# Patient Record
Sex: FEMALE | Race: WHITE | NOT HISPANIC OR LATINO | Employment: FULL TIME | URBAN - METROPOLITAN AREA
[De-identification: names, ages, dates, MRNs, and addresses within clinical notes are randomized per-mention and may not be internally consistent; named-entity substitution may affect disease eponyms.]

---

## 2018-06-23 ENCOUNTER — HISTORICAL (OUTPATIENT)
Dept: ADMINISTRATIVE | Facility: HOSPITAL | Age: 23
End: 2018-06-23

## 2022-03-21 LAB
PAP RECOMMENDATION EXT: NORMAL
PAP SMEAR: NORMAL

## 2022-03-24 LAB
PAP RECOMMENDATION EXT: NORMAL
PAP SMEAR: NORMAL

## 2022-04-06 ENCOUNTER — HISTORICAL (OUTPATIENT)
Dept: ADMINISTRATIVE | Facility: HOSPITAL | Age: 27
End: 2022-04-06

## 2022-04-06 LAB
T3FREE SERPL-MCNC: 2.33 PG/ML (ref 1.58–3.91)
T4 FREE SERPL-MCNC: 0.96 NG/DL (ref 0.7–1.48)
TSH SERPL-ACNC: 0.39 M[IU]/L (ref 0.35–4.94)

## 2022-07-25 ENCOUNTER — TELEPHONE (OUTPATIENT)
Dept: INTERNAL MEDICINE | Facility: CLINIC | Age: 27
End: 2022-07-25
Payer: COMMERCIAL

## 2022-07-25 NOTE — TELEPHONE ENCOUNTER
So the thyroid function was just checked in April I do not think this needs to be checked again is there any particular reason why she thinks it needs to be rechecked?

## 2022-07-25 NOTE — TELEPHONE ENCOUNTER
----- Message from Matilda Granados sent at 7/25/2022  2:30 PM CDT -----  Regarding: labs  Type:  Needs Medical Advice    Who Called: pt  Would the patient rather a call back or a response via [facility]? C/b  Best Call Back Number: [phone]  Additional Information: pt called has appt for labs to be drawn tomorrow and none are in the system, please put orders in.  Thank you

## 2022-07-26 ENCOUNTER — OCCUPATIONAL HEALTH (OUTPATIENT)
Dept: URGENT CARE | Facility: CLINIC | Age: 27
End: 2022-07-26
Payer: COMMERCIAL

## 2022-07-26 DIAGNOSIS — R52 ACHES: ICD-10-CM

## 2022-07-26 DIAGNOSIS — R52 ACHES: Primary | ICD-10-CM

## 2022-07-26 LAB
CTP QC/QA: YES
SARS-COV-2 RDRP RESP QL NAA+PROBE: POSITIVE

## 2022-07-26 PROCEDURE — U0002: ICD-10-PCS | Mod: QW,,, | Performed by: FAMILY MEDICINE

## 2022-07-26 PROCEDURE — U0002 COVID-19 LAB TEST NON-CDC: HCPCS | Mod: QW,,, | Performed by: FAMILY MEDICINE

## 2022-07-26 NOTE — PROGRESS NOTES
Employee presents to clinic for rapid covid poc testing. Sent after contacting [email or web address]. Result is positive. Pt informed to follow up with one up. Result visible in [facility].

## 2022-07-27 ENCOUNTER — TELEPHONE (OUTPATIENT)
Dept: INTERNAL MEDICINE | Facility: CLINIC | Age: 27
End: 2022-07-27

## 2022-07-27 RX ORDER — LEVOTHYROXINE SODIUM 112 UG/1
112 TABLET ORAL DAILY
COMMUNITY
Start: 2022-05-09

## 2022-07-27 RX ORDER — METFORMIN HYDROCHLORIDE 750 MG/1
750 TABLET, EXTENDED RELEASE ORAL
COMMUNITY
Start: 2022-05-16

## 2022-07-27 NOTE — TELEPHONE ENCOUNTER
----- Message from Matilda Granados sent at 7/27/2022  9:50 AM CDT -----  Regarding: advice  Type:  Needs Medical Advice    Who Called: pt  Symptoms (please be specific): congestion, cough, body & headache test positive for covid yesterday   How long has patient had these symptoms:  sunday  Pharmacy [facility] and phone [facility] [place]  Would the patient rather a call back or a response via [facility]? C/b  Best Call Back Number: [phone]  Additional Information: pt cancld today's appt, tested positive for covid. Pt tested positive at walk in clinic they did not give pt any medication

## 2022-07-27 NOTE — TELEPHONE ENCOUNTER
Patient would script sent to [facility] in [place], instructed to use quarantine for 5 days from testing positive, drinking plenty of fluids, take tylenol for fever.

## 2022-07-27 NOTE — TELEPHONE ENCOUNTER
I do not see any progress notes or even a weight listed on this patient to see if she meets any of the criteria does she have a BMI of greater than 25?

## 2022-07-28 ENCOUNTER — TELEPHONE (OUTPATIENT)
Dept: INTERNAL MEDICINE | Facility: CLINIC | Age: 27
End: 2022-07-28
Payer: COMMERCIAL

## 2022-08-31 RX ORDER — LETROZOLE 2.5 MG/1
TABLET, FILM COATED ORAL
COMMUNITY
Start: 2022-06-20 | End: 2022-09-01

## 2022-08-31 RX ORDER — MEDROXYPROGESTERONE ACETATE 5 MG/1
5 TABLET ORAL DAILY
COMMUNITY
Start: 2022-05-11 | End: 2022-09-01

## 2022-09-01 ENCOUNTER — OFFICE VISIT (OUTPATIENT)
Dept: INTERNAL MEDICINE | Facility: CLINIC | Age: 27
End: 2022-09-01
Payer: COMMERCIAL

## 2022-09-01 VITALS
BODY MASS INDEX: 29.24 KG/M2 | DIASTOLIC BLOOD PRESSURE: 74 MMHG | HEART RATE: 75 BPM | RESPIRATION RATE: 16 BRPM | HEIGHT: 61 IN | WEIGHT: 154.88 LBS | OXYGEN SATURATION: 99 % | TEMPERATURE: 97 F | SYSTOLIC BLOOD PRESSURE: 120 MMHG

## 2022-09-01 DIAGNOSIS — N97.9 INFERTILITY, FEMALE: ICD-10-CM

## 2022-09-01 DIAGNOSIS — E28.2 PCOS (POLYCYSTIC OVARIAN SYNDROME): ICD-10-CM

## 2022-09-01 DIAGNOSIS — E03.9 HYPOTHYROIDISM, UNSPECIFIED TYPE: ICD-10-CM

## 2022-09-01 PROCEDURE — 3008F PR BODY MASS INDEX (BMI) DOCUMENTED: ICD-10-PCS | Mod: CPTII,,, | Performed by: INTERNAL MEDICINE

## 2022-09-01 PROCEDURE — 1159F MED LIST DOCD IN RCRD: CPT | Mod: CPTII,,, | Performed by: INTERNAL MEDICINE

## 2022-09-01 PROCEDURE — 3074F PR MOST RECENT SYSTOLIC BLOOD PRESSURE < 130 MM HG: ICD-10-PCS | Mod: CPTII,,, | Performed by: INTERNAL MEDICINE

## 2022-09-01 PROCEDURE — 1160F RVW MEDS BY RX/DR IN RCRD: CPT | Mod: CPTII,,, | Performed by: INTERNAL MEDICINE

## 2022-09-01 PROCEDURE — 1159F PR MEDICATION LIST DOCUMENTED IN MEDICAL RECORD: ICD-10-PCS | Mod: CPTII,,, | Performed by: INTERNAL MEDICINE

## 2022-09-01 PROCEDURE — 1160F PR REVIEW ALL MEDS BY PRESCRIBER/CLIN PHARMACIST DOCUMENTED: ICD-10-PCS | Mod: CPTII,,, | Performed by: INTERNAL MEDICINE

## 2022-09-01 PROCEDURE — 3074F SYST BP LT 130 MM HG: CPT | Mod: CPTII,,, | Performed by: INTERNAL MEDICINE

## 2022-09-01 PROCEDURE — 3078F PR MOST RECENT DIASTOLIC BLOOD PRESSURE < 80 MM HG: ICD-10-PCS | Mod: CPTII,,, | Performed by: INTERNAL MEDICINE

## 2022-09-01 PROCEDURE — 99213 OFFICE O/P EST LOW 20 MIN: CPT | Mod: ,,, | Performed by: INTERNAL MEDICINE

## 2022-09-01 PROCEDURE — 3008F BODY MASS INDEX DOCD: CPT | Mod: CPTII,,, | Performed by: INTERNAL MEDICINE

## 2022-09-01 PROCEDURE — 3078F DIAST BP <80 MM HG: CPT | Mod: CPTII,,, | Performed by: INTERNAL MEDICINE

## 2022-09-01 PROCEDURE — 99213 PR OFFICE/OUTPT VISIT, EST, LEVL III, 20-29 MIN: ICD-10-PCS | Mod: ,,, | Performed by: INTERNAL MEDICINE

## 2022-09-01 NOTE — PROGRESS NOTES
Subjective:      Patient ID: Flex Emmanuel is a 27 y.o. female.    Chief Complaint: Follow-up      HPI:    27 year old female here for 6 month revisit  PT at [facility]  From [place]  Mom alive at 63; dx with breast cancer at 50.  Dad alive at 57 with ALINE  1 twin sister with hypothyroidism  Dr Harp was her PCP; Betty ; She has been amenorrheic since before Meena. Had some labs and ultrasound done, AMH of 17 was diagnosed with PCOS.   3 rounds of Femara with Puglessi with no luck  Goes in October to see Dr. Elmore  Sergio-insotol supplement    Problem List Items Addressed This Visit          Renal/    Infertility, female       Endocrine    Hypothyroid    PCOS (polycystic ovarian syndrome)           Past Medical History:  Past Medical History:   Diagnosis Date    ADD (attention deficit disorder)     Alopecia     Fatigue     Scoliosis     Weight gain      History reviewed. No pertinent surgical history.  Review of patient's allergies indicates:  No Known Allergies  Current Outpatient Medications on File Prior to Visit   Medication Sig Dispense Refill    levothyroxine (SYNTHROID) 112 MCG tablet Take 112 mcg by mouth once daily.      metFORMIN (GLUCOPHAGE-XR) 750 MG ER 24hr tablet Take 750 mg by mouth 2 (two) times daily.      [DISCONTINUED] letrozole (FEMARA) 2.5 mg Tab Take by mouth.      [DISCONTINUED] medroxyPROGESTERone (PROVERA) 5 MG tablet Take 5 mg by mouth once daily.       No current facility-administered medications on file prior to visit.     Social History     Socioeconomic History    Marital status:    Tobacco Use    Smoking status: Never    Smokeless tobacco: Never   Substance and Sexual Activity    Alcohol use: Never    Drug use: Never    Sexual activity: Yes     Partners: Male     Birth control/protection: None   Social History Narrative    ** Merged History Encounter **          Family History   Problem Relation Age of Onset    Cancer Mother     Heart disease Father     Stroke Paternal  "Grandfather            Review of Systems  Constitutional: No fever,  no fatigue, no chills, no night sweats, no weight gain, no weight loss, no changes in appetite.   Eye: No redness, no acute vision loss, no blurred vision, no double vision, no eye pain  ENMT: No sore throat, no nasal drainage, no nose bleeds,  no headache, no ear pain, no ear drainage, no acute hearing loss  Respiratory: No cough, no sputum production, no shortness of breath, no hemoptysis, no wheezing.  Cardiovascular: No chest pain, no chest tightness, no QUINTERO, no PND, no orthopnea, no swelling, no palpitations.  Gastrointestinal: No abdominal pain, no nausea, no vomiting, no diarrhea, no constipation, no difficulty swallowing, no change in bowel habits, no rectal bleeding  Genitourinary: no urgency, no frequency, no burning or pain when urinating, no blood in urine, no incontinence  Heme/Lymph: No easy bruising and/or bleeding, no swollen or painful glands.  Endocrine: No polyuria, no polydipsia, no polyphagia, no heat or cold intolerance.  Musculoskeletal: No muscle pain, no muscle weakness, no joint pain, no red or swollen joints.  Integumentary: No rash, no pruritis, no hair or nail changes.  Neurologic: No dizziness, no fainting, no tremors, no tingling and/ or numbness.  Psychiatric: No anxiety, no depression, no memory loss  All Other ROS: Negative with exception of what is documented in the history of present illness     Objective:   /74 (BP Location: Left arm, Patient Position: Sitting, BP Method: Medium (Manual))   Pulse 75   Temp 97.2 °F (36.2 °C) (Temporal)   Resp 16   Ht 5' 1" (1.549 m)   Wt 70.3 kg (154 lb 14.4 oz)   LMP  (LMP Unknown)   SpO2 99%   BMI 29.27 kg/m²     Physical Exam  General : Alert and oriented, No acute distress, well, developed, well nourished, afebrile   Eye : PERRLA. EOMI.   HEENT : Normocephalic. Neck supple.   Respiratory : Lungs are clear to auscultation bilaterally, non-labored. Symmetrical " chest wall expansion. No crackles, wheeze, or rhonci.  Cardiovascular : Normal rate, Regular rhythm. No murmurs, rubs, or gallops. Pulses 2+ in all extremities. No Edema.  Gastrointestinal : Soft, nontender, non-distended, bowel sounds normal, no organomegaly, no guarding, no rebound.  Musculoskeletal : Normal ROM.  No muscle tenderness.  Integumentary : Warm to touch. Intact. No rash.    Neurologic : Alert and oriented. No focal deficits.  Psychiatric : Cooperative, Appropriate mood & affect. Normal judgment.          Assessment:     1. Infertility, female    2. Hypothyroidism, unspecified type    3. PCOS (polycystic ovarian syndrome)            Plan:       I have discontinued Flex Emmanuel's letrozole and medroxyPROGESTERone. I am also having her maintain her levothyroxine and metFORMIN.      Problem List Items Addressed This Visit          Renal/    Infertility, female       Endocrine    Hypothyroid    PCOS (polycystic ovarian syndrome)         Flex was seen today for follow-up.    Diagnoses and all orders for this visit:    Infertility, female    Hypothyroidism, unspecified type    PCOS (polycystic ovarian syndrome)             [unfilled]  No orders of the defined types were placed in this encounter.      Medication List with Changes/Refills   Current Medications    LEVOTHYROXINE (SYNTHROID) 112 MCG TABLET    Take 112 mcg by mouth once daily.    METFORMIN (GLUCOPHAGE-XR) 750 MG ER 24HR TABLET    Take 750 mg by mouth 2 (two) times daily.   Discontinued Medications    LETROZOLE (FEMARA) 2.5 MG TAB    Take by mouth.    MEDROXYPROGESTERONE (PROVERA) 5 MG TABLET    Take 5 mg by mouth once daily.      Medication List with Changes/Refills   Current Medications    LEVOTHYROXINE (SYNTHROID) 112 MCG TABLET    Take 112 mcg by mouth once daily.       Start Date: 5/9/2022  End Date: --    METFORMIN (GLUCOPHAGE-XR) 750 MG ER 24HR TABLET    Take 750 mg by mouth 2 (two) times daily.       Start Date: 5/16/2022 End Date:  --   Discontinued Medications    LETROZOLE (FEMARA) 2.5 MG TAB    Take by mouth.       Start Date: 6/20/2022 End Date: 9/1/2022    MEDROXYPROGESTERONE (PROVERA) 5 MG TABLET    Take 5 mg by mouth once daily.       Start Date: 5/11/2022 End Date: 9/1/2022            Follow up in about 6 months (around 3/1/2023) for WELLNESS.

## 2022-09-03 ENCOUNTER — DOCUMENTATION ONLY (OUTPATIENT)
Dept: INTERNAL MEDICINE | Facility: CLINIC | Age: 27
End: 2022-09-03
Payer: COMMERCIAL

## 2022-12-30 ENCOUNTER — OFFICE VISIT (OUTPATIENT)
Dept: URGENT CARE | Facility: CLINIC | Age: 27
End: 2022-12-30
Payer: COMMERCIAL

## 2022-12-30 VITALS
OXYGEN SATURATION: 98 % | SYSTOLIC BLOOD PRESSURE: 123 MMHG | HEIGHT: 61 IN | TEMPERATURE: 99 F | HEART RATE: 97 BPM | WEIGHT: 154 LBS | DIASTOLIC BLOOD PRESSURE: 74 MMHG | BODY MASS INDEX: 29.07 KG/M2 | RESPIRATION RATE: 18 BRPM

## 2022-12-30 DIAGNOSIS — J02.9 SORE THROAT: ICD-10-CM

## 2022-12-30 DIAGNOSIS — J32.9 SINUSITIS, UNSPECIFIED CHRONICITY, UNSPECIFIED LOCATION: Primary | ICD-10-CM

## 2022-12-30 LAB
CTP QC/QA: YES
MOLECULAR STREP A: NEGATIVE
POC MOLECULAR INFLUENZA A AGN: NEGATIVE
POC MOLECULAR INFLUENZA B AGN: NEGATIVE
SARS-COV-2 RDRP RESP QL NAA+PROBE: NEGATIVE

## 2022-12-30 PROCEDURE — 96372 THER/PROPH/DIAG INJ SC/IM: CPT | Mod: ,,, | Performed by: FAMILY MEDICINE

## 2022-12-30 PROCEDURE — 99213 PR OFFICE/OUTPT VISIT, EST, LEVL III, 20-29 MIN: ICD-10-PCS | Mod: 25,,, | Performed by: FAMILY MEDICINE

## 2022-12-30 PROCEDURE — 1160F RVW MEDS BY RX/DR IN RCRD: CPT | Mod: CPTII,,, | Performed by: FAMILY MEDICINE

## 2022-12-30 PROCEDURE — 99213 OFFICE O/P EST LOW 20 MIN: CPT | Mod: 25,,, | Performed by: FAMILY MEDICINE

## 2022-12-30 PROCEDURE — 1160F PR REVIEW ALL MEDS BY PRESCRIBER/CLIN PHARMACIST DOCUMENTED: ICD-10-PCS | Mod: CPTII,,, | Performed by: FAMILY MEDICINE

## 2022-12-30 PROCEDURE — 3008F PR BODY MASS INDEX (BMI) DOCUMENTED: ICD-10-PCS | Mod: CPTII,,, | Performed by: FAMILY MEDICINE

## 2022-12-30 PROCEDURE — 87651 STREP A DNA AMP PROBE: CPT | Mod: QW,,, | Performed by: FAMILY MEDICINE

## 2022-12-30 PROCEDURE — 87502 INFLUENZA DNA AMP PROBE: CPT | Mod: QW,,, | Performed by: FAMILY MEDICINE

## 2022-12-30 PROCEDURE — 3074F SYST BP LT 130 MM HG: CPT | Mod: CPTII,,, | Performed by: FAMILY MEDICINE

## 2022-12-30 PROCEDURE — 3008F BODY MASS INDEX DOCD: CPT | Mod: CPTII,,, | Performed by: FAMILY MEDICINE

## 2022-12-30 PROCEDURE — 87502 POCT INFLUENZA A/B MOLECULAR: ICD-10-PCS | Mod: QW,,, | Performed by: FAMILY MEDICINE

## 2022-12-30 PROCEDURE — 3078F PR MOST RECENT DIASTOLIC BLOOD PRESSURE < 80 MM HG: ICD-10-PCS | Mod: CPTII,,, | Performed by: FAMILY MEDICINE

## 2022-12-30 PROCEDURE — 1159F PR MEDICATION LIST DOCUMENTED IN MEDICAL RECORD: ICD-10-PCS | Mod: CPTII,,, | Performed by: FAMILY MEDICINE

## 2022-12-30 PROCEDURE — 3078F DIAST BP <80 MM HG: CPT | Mod: CPTII,,, | Performed by: FAMILY MEDICINE

## 2022-12-30 PROCEDURE — 87651 POCT STREP A MOLECULAR: ICD-10-PCS | Mod: QW,,, | Performed by: FAMILY MEDICINE

## 2022-12-30 PROCEDURE — 96372 PR INJECTION,THERAP/PROPH/DIAG2ST, IM OR SUBCUT: ICD-10-PCS | Mod: ,,, | Performed by: FAMILY MEDICINE

## 2022-12-30 PROCEDURE — 87635 SARS-COV-2 COVID-19 AMP PRB: CPT | Mod: QW,,, | Performed by: FAMILY MEDICINE

## 2022-12-30 PROCEDURE — 3074F PR MOST RECENT SYSTOLIC BLOOD PRESSURE < 130 MM HG: ICD-10-PCS | Mod: CPTII,,, | Performed by: FAMILY MEDICINE

## 2022-12-30 PROCEDURE — 87635: ICD-10-PCS | Mod: QW,,, | Performed by: FAMILY MEDICINE

## 2022-12-30 PROCEDURE — 1159F MED LIST DOCD IN RCRD: CPT | Mod: CPTII,,, | Performed by: FAMILY MEDICINE

## 2022-12-30 RX ORDER — AMOXICILLIN AND CLAVULANATE POTASSIUM 875; 125 MG/1; MG/1
1 TABLET, FILM COATED ORAL EVERY 12 HOURS
Qty: 14 TABLET | Refills: 0 | Status: SHIPPED | OUTPATIENT
Start: 2022-12-30 | End: 2023-01-02

## 2022-12-30 RX ORDER — DEXAMETHASONE SODIUM PHOSPHATE 100 MG/10ML
10 INJECTION INTRAMUSCULAR; INTRAVENOUS
Status: COMPLETED | OUTPATIENT
Start: 2022-12-30 | End: 2022-12-30

## 2022-12-30 RX ORDER — SODIUM CHLORIDE 9 MG/ML
INJECTION, SOLUTION INTRAVENOUS ONCE
Status: DISCONTINUED | OUTPATIENT
Start: 2022-12-30 | End: 2022-12-30

## 2022-12-30 RX ADMIN — DEXAMETHASONE SODIUM PHOSPHATE 10 MG: 100 INJECTION INTRAMUSCULAR; INTRAVENOUS at 12:12

## 2022-12-30 NOTE — PROGRESS NOTES
"Subjective:       Patient ID: Flex Emmanuel is a 27 y.o. female.    Vitals:  height is 5' 1" (1.549 m) and weight is 69.9 kg (154 lb). Her temperature is 98.6 °F (37 °C). Her blood pressure is 123/74 and her pulse is 97. Her respiration is 18 and oxygen saturation is 98%.     Chief Complaint: Cough    27 y.o. female presents to clinic w/ c/o 2 day history of fever (subjective), clear nasal drainage, non-productive cough and sore throat. At home covid test negative yesterday. Robitussin helping.  Denies any shortness of breath nausea vomiting diarrhea ear pain or pressure.    Cough    Constitution: Negative.   HENT: Negative.     Cardiovascular: Negative.    Eyes: Negative.    Respiratory:  Positive for cough.    Gastrointestinal: Negative.    Genitourinary: Negative.    Musculoskeletal: Negative.    Skin: Negative.    Allergic/Immunologic: Negative.    Neurological: Negative.    Hematologic/Lymphatic: Negative.      Objective:      Physical Exam   Constitutional: She is oriented to person, place, and time. She appears well-developed. She is cooperative.  Non-toxic appearance. She does not appear ill. No distress.   HENT:   Head: Normocephalic and atraumatic.   Ears:   Right Ear: Hearing, tympanic membrane and external ear normal.   Left Ear: Hearing, tympanic membrane and external ear normal.   Mouth/Throat: Oropharynx is clear and moist and mucous membranes are normal. Posterior oropharyngeal erythema: postnasal drip.   Eyes: Conjunctivae and lids are normal.   Neck: Trachea normal and phonation normal. Neck supple. No edema present. No erythema present. No neck rigidity present.   Cardiovascular: Normal rate.   Pulmonary/Chest: Effort normal and breath sounds normal. No stridor. No respiratory distress. She has no decreased breath sounds. She has no wheezes. She has no rhonchi. She has no rales.   Abdominal: Normal appearance.   Neurological: She is alert and oriented to person, place, and time. She exhibits " "normal muscle tone. Coordination normal.   Skin: Skin is warm, dry, intact, not diaphoretic and no rash.   Psychiatric: Her speech is normal and behavior is normal. Mood, judgment and thought content normal.   Nursing note and vitals reviewed.         Previous History      Review of patient's allergies indicates:  No Known Allergies    Past Medical History:   Diagnosis Date    ADD (attention deficit disorder)     Alopecia     Fatigue     Scoliosis     Thyroid disease     Weight gain      Current Outpatient Medications   Medication Instructions    amoxicillin-clavulanate 875-125mg (AUGMENTIN) 875-125 mg per tablet 1 tablet, Oral, Every 12 hours    levothyroxine (SYNTHROID) 112 mcg, Oral, Daily    metFORMIN (GLUCOPHAGE-XR) 750 mg, Oral, 2 times daily     History reviewed. No pertinent surgical history.  Family History   Problem Relation Age of Onset    Cancer Mother     Heart disease Father     Stroke Paternal Grandfather        Social History     Tobacco Use    Smoking status: Never    Smokeless tobacco: Never   Substance Use Topics    Alcohol use: Never    Drug use: Never        Physical Exam      Vital Signs Reviewed   /74   Pulse 97   Temp 98.6 °F (37 °C)   Resp 18   Ht 5' 1" (1.549 m)   Wt 69.9 kg (154 lb)   LMP 12/09/2022 Comment: Possibility of pregnancy  SpO2 98%   BMI 29.10 kg/m²        Procedures    Procedures     Labs     Results for orders placed or performed in visit on 12/30/22   POCT COVID-19 Rapid Screening   Result Value Ref Range    POC Rapid COVID Negative Negative     Acceptable Yes    POCT Influenza A/B MOLECULAR   Result Value Ref Range    POC Molecular Influenza A Ag Negative Negative, Not Reported    POC Molecular Influenza B Ag Negative Negative, Not Reported     Acceptable Yes    POCT Strep A, Molecular   Result Value Ref Range    Molecular Strep A, POC Negative Negative     Acceptable Yes        Assessment:       1. Sinusitis, " unspecified chronicity, unspecified location    2. Sore throat          Plan:       Negative COVID flu strep  Medications sent to pharmacy  Start taking an allergy pill daily such as claritin, zyrtec, allegrea or xyzal. Also start using a nasal steroid spray such as flonase or nasacort daily. If you are not being treated for high blood pressure, you can also take decongestant such as sudafed as needed. They can be purchased over the counter. If oral steroids were prescribed, start them tomorrow morning. Monitor for fever. Take tylenol/acetaminophen or ibuprofen as needed. Rest and hydrate. If symptoms persist or worsen, return to clinic or seek medical attention immediately.     Amoxicillin sent to pharmacy  Dexamethasone given in clinic

## 2022-12-30 NOTE — PATIENT INSTRUCTIONS
Negative COVID flu strep  Medications sent to pharmacy  Start taking an allergy pill daily such as claritin, zyrtec, allegrea or xyzal. Also start using a nasal steroid spray such as flonase or nasacort daily. If you are not being treated for high blood pressure, you can also take decongestant such as sudafed as needed. They can be purchased over the counter. If oral steroids were prescribed, start them tomorrow morning. Monitor for fever. Take tylenol/acetaminophen or ibuprofen as needed. Rest and hydrate. If symptoms persist or worsen, return to clinic or seek medical attention immediately.

## 2023-01-02 RX ORDER — AMOXICILLIN AND CLAVULANATE POTASSIUM 875; 125 MG/1; MG/1
1 TABLET, FILM COATED ORAL EVERY 12 HOURS
Qty: 14 TABLET | Refills: 0 | Status: SHIPPED | OUTPATIENT
Start: 2023-01-02 | End: 2023-01-09

## 2023-01-05 ENCOUNTER — DOCUMENTATION ONLY (OUTPATIENT)
Dept: INTERNAL MEDICINE | Facility: CLINIC | Age: 28
End: 2023-01-05

## 2023-01-24 ENCOUNTER — TELEPHONE (OUTPATIENT)
Dept: INTERNAL MEDICINE | Facility: CLINIC | Age: 28
End: 2023-01-24
Payer: COMMERCIAL

## 2023-03-20 DIAGNOSIS — Z00.00 WELLNESS EXAMINATION: Primary | ICD-10-CM

## 2023-03-20 DIAGNOSIS — Z13.29 SCREENING FOR HYPOTHYROIDISM: ICD-10-CM

## 2023-03-20 DIAGNOSIS — E55.9 VITAMIN D DEFICIENCY: ICD-10-CM

## 2023-03-31 ENCOUNTER — OFFICE VISIT (OUTPATIENT)
Dept: INTERNAL MEDICINE | Facility: CLINIC | Age: 28
End: 2023-03-31
Payer: COMMERCIAL

## 2023-03-31 VITALS
BODY MASS INDEX: 29.7 KG/M2 | OXYGEN SATURATION: 99 % | WEIGHT: 157.19 LBS | HEART RATE: 86 BPM | RESPIRATION RATE: 18 BRPM | DIASTOLIC BLOOD PRESSURE: 74 MMHG | TEMPERATURE: 98 F | SYSTOLIC BLOOD PRESSURE: 126 MMHG

## 2023-03-31 DIAGNOSIS — Z00.00 WELLNESS EXAMINATION: Primary | ICD-10-CM

## 2023-03-31 DIAGNOSIS — Z13.6 SCREENING FOR CARDIOVASCULAR CONDITION: ICD-10-CM

## 2023-03-31 DIAGNOSIS — E03.9 HYPOTHYROIDISM, UNSPECIFIED TYPE: ICD-10-CM

## 2023-03-31 DIAGNOSIS — E28.2 PCOS (POLYCYSTIC OVARIAN SYNDROME): ICD-10-CM

## 2023-03-31 DIAGNOSIS — Z3A.16 16 WEEKS GESTATION OF PREGNANCY: ICD-10-CM

## 2023-03-31 DIAGNOSIS — Z00.00 WELL ADULT EXAM: ICD-10-CM

## 2023-03-31 DIAGNOSIS — E03.9 HYPOTHYROIDISM, UNSPECIFIED TYPE: Primary | ICD-10-CM

## 2023-03-31 PROBLEM — Z34.90 PREGNANCY: Status: ACTIVE | Noted: 2023-03-31

## 2023-03-31 PROCEDURE — 3008F BODY MASS INDEX DOCD: CPT | Mod: CPTII,,,

## 2023-03-31 PROCEDURE — 3078F PR MOST RECENT DIASTOLIC BLOOD PRESSURE < 80 MM HG: ICD-10-PCS | Mod: CPTII,,,

## 2023-03-31 PROCEDURE — 1160F PR REVIEW ALL MEDS BY PRESCRIBER/CLIN PHARMACIST DOCUMENTED: ICD-10-PCS | Mod: CPTII,,,

## 2023-03-31 PROCEDURE — 1159F PR MEDICATION LIST DOCUMENTED IN MEDICAL RECORD: ICD-10-PCS | Mod: CPTII,,,

## 2023-03-31 PROCEDURE — 99214 OFFICE O/P EST MOD 30 MIN: CPT | Mod: ,,,

## 2023-03-31 PROCEDURE — 3078F DIAST BP <80 MM HG: CPT | Mod: CPTII,,,

## 2023-03-31 PROCEDURE — 3008F PR BODY MASS INDEX (BMI) DOCUMENTED: ICD-10-PCS | Mod: CPTII,,,

## 2023-03-31 PROCEDURE — 99214 PR OFFICE/OUTPT VISIT, EST, LEVL IV, 30-39 MIN: ICD-10-PCS | Mod: ,,,

## 2023-03-31 PROCEDURE — 3074F SYST BP LT 130 MM HG: CPT | Mod: CPTII,,,

## 2023-03-31 PROCEDURE — 1159F MED LIST DOCD IN RCRD: CPT | Mod: CPTII,,,

## 2023-03-31 PROCEDURE — 3074F PR MOST RECENT SYSTOLIC BLOOD PRESSURE < 130 MM HG: ICD-10-PCS | Mod: CPTII,,,

## 2023-03-31 PROCEDURE — 1160F RVW MEDS BY RX/DR IN RCRD: CPT | Mod: CPTII,,,

## 2023-03-31 NOTE — PROGRESS NOTES
Patient ID: Flex Emmanuel is a 27 y.o. female.    Chief Complaint: No chief complaint on file.    HPI    MEDICAL HISTORY:    Past Medical History:   Diagnosis Date    ADD (attention deficit disorder)     Alopecia     Fatigue     Scoliosis     Thyroid disease     Weight gain       No past surgical history on file.   Social History     Tobacco Use    Smoking status: Never    Smokeless tobacco: Never   Substance Use Topics    Alcohol use: Never    Drug use: Never          Health Maintenance Due   Topic Date Due    Hepatitis C Screening  Never done    HIV Screening  Never done    COVID-19 Vaccine (3 - Booster for Pfizer series) 04/02/2021          Patient Care Team:  Damion Rae MD as PCP - General (Internal Medicine)  Damion Rae MD (Internal Medicine)      Review of Systems    Objective:   There were no vitals taken for this visit.     Physical Exam      Assessment:   No diagnosis found.     Plan:     Problem List Items Addressed This Visit    None         No follow-ups on file.   -plan specifics discussed above          Medication List with Changes/Refills   Current Medications    LEVOTHYROXINE (SYNTHROID) 112 MCG TABLET    Take 112 mcg by mouth once daily.    METFORMIN (GLUCOPHAGE-XR) 750 MG ER 24HR TABLET    Take 750 mg by mouth 2 (two) times daily.

## 2023-03-31 NOTE — ASSESSMENT & PLAN NOTE
16 weeks pregnant   -currently established with gyn and endocrinology, continue  -currently on prenatal vitamins, continue

## 2023-03-31 NOTE — PROGRESS NOTES
Patient ID: Flxe Emmanuel is a 27 y.o. female.    Chief Complaint: Follow-up (Doing well pt is pregnant )    27-year-old female here today for a routine follow-up visit.  Medical comorbidities ADD, scoliosis, PCOS, and hypothyroidism.  Since patient reports she has been fairly well without acute injury or major illness.  Is now 16 weeks pregnant established with Dr. Hernández.  Also having thyroid managed by Dr. Vazquez.  Generally feeling well without acute complaints.  Vital signs stable during visit.  Discussed pushing back upcoming wellness visit with labs till after pregnancy, for which patient was in understanding.            MEDICAL HISTORY:    Past Medical History:   Diagnosis Date    ADD (attention deficit disorder)     Alopecia     Fatigue     Scoliosis     Thyroid disease     Weight gain       History reviewed. No pertinent surgical history.   Social History     Tobacco Use    Smoking status: Never    Smokeless tobacco: Never   Substance Use Topics    Alcohol use: Never    Drug use: Never          Health Maintenance Due   Topic Date Due    Hepatitis C Screening  Never done    HIV Screening  Never done    COVID-19 Vaccine (3 - Booster for Pfizer series) 04/02/2021          Patient Care Team:  Damion Rae MD as PCP - General (Internal Medicine)  Damion Rae MD (Internal Medicine)      Review of Systems   Constitutional:  Negative for fatigue and fever.   HENT:  Negative for congestion, rhinorrhea, sore throat and trouble swallowing.    Eyes:  Negative for redness and visual disturbance.   Respiratory:  Negative for cough, chest tightness and shortness of breath.    Cardiovascular:  Negative for chest pain and palpitations.   Gastrointestinal:  Negative for abdominal pain, constipation, diarrhea, nausea and vomiting.   Genitourinary:  Negative for dysuria, flank pain, frequency and urgency.   Musculoskeletal:  Negative for arthralgias, gait problem and myalgias.   Skin:   Negative for rash and wound.   Neurological:  Negative for facial asymmetry, speech difficulty, weakness and headaches.   All other systems reviewed and are negative.    Objective:   /74 (BP Location: Left arm, Patient Position: Sitting, BP Method: Small (Automatic))   Pulse 86   Temp 97.7 °F (36.5 °C) (Temporal)   Resp 18   Wt 71.3 kg (157 lb 3.2 oz)   LMP 12/09/2022 Comment: Possibility of pregnancy  SpO2 99%   BMI 29.70 kg/m²      Physical Exam  Constitutional:       General: She is not in acute distress.     Appearance: Normal appearance.   HENT:      Right Ear: Tympanic membrane, ear canal and external ear normal.      Left Ear: Tympanic membrane, ear canal and external ear normal.      Nose: Nose normal.      Mouth/Throat:      Mouth: Mucous membranes are moist.      Pharynx: Oropharynx is clear.   Eyes:      Extraocular Movements: Extraocular movements intact.      Conjunctiva/sclera: Conjunctivae normal.      Pupils: Pupils are equal, round, and reactive to light.   Cardiovascular:      Rate and Rhythm: Normal rate and regular rhythm.      Pulses: Normal pulses.      Heart sounds: Normal heart sounds. No murmur heard.    No gallop.   Pulmonary:      Effort: Pulmonary effort is normal.      Breath sounds: Normal breath sounds. No wheezing.   Abdominal:      General: Bowel sounds are normal. There is no distension.      Palpations: Abdomen is soft. There is no mass.      Tenderness: There is no abdominal tenderness. There is no guarding.   Musculoskeletal:         General: Normal range of motion.   Skin:     General: Skin is warm and dry.   Neurological:      Mental Status: She is alert. Mental status is at baseline.      Sensory: No sensory deficit.      Motor: No weakness.         Assessment:       ICD-10-CM ICD-9-CM   1. 16 weeks gestation of pregnancy  Z3A.16 V22.2   2. Hypothyroidism, unspecified type  E03.9 244.9        Plan:     Problem List Items Addressed This Visit          Endocrine     Hypothyroid     -no lab currently available to review  -however followed by endocrinology as well as OBGYN since pregnant  -currently on levothyroxine, continue            Obstetric    Pregnancy     16 weeks pregnant   -currently established with gyn and endocrinology, continue  -currently on prenatal vitamins, continue               Follow up in about 6 months (around 9/30/2023) for Wellness with labs prior to visit (Sadie).   -plan specifics discussed above          Medication List with Changes/Refills   Current Medications    LEVOTHYROXINE (SYNTHROID) 112 MCG TABLET    Take 112 mcg by mouth once daily.    METFORMIN (GLUCOPHAGE-XR) 750 MG ER 24HR TABLET    Take 750 mg by mouth 2 (two) times daily.

## 2023-03-31 NOTE — ASSESSMENT & PLAN NOTE
-no lab currently available to review  -however followed by endocrinology as well as OBGYN since pregnant  -currently on levothyroxine, continue

## 2023-09-10 DIAGNOSIS — Z34.90 ENCOUNTER FOR INDUCTION OF LABOR: Primary | ICD-10-CM

## 2023-09-10 RX ORDER — METHYLERGONOVINE MALEATE 0.2 MG/ML
200 INJECTION INTRAVENOUS
Status: CANCELLED | OUTPATIENT
Start: 2023-09-10

## 2023-09-10 RX ORDER — OXYTOCIN/RINGER'S LACTATE 30/500 ML
334 PLASTIC BAG, INJECTION (ML) INTRAVENOUS ONCE
Status: CANCELLED | OUTPATIENT
Start: 2023-09-10 | End: 2023-09-10

## 2023-09-10 RX ORDER — SODIUM CHLORIDE, SODIUM LACTATE, POTASSIUM CHLORIDE, CALCIUM CHLORIDE 600; 310; 30; 20 MG/100ML; MG/100ML; MG/100ML; MG/100ML
INJECTION, SOLUTION INTRAVENOUS CONTINUOUS
Status: CANCELLED | OUTPATIENT
Start: 2023-09-10

## 2023-09-10 RX ORDER — ACETAMINOPHEN 325 MG/1
650 TABLET ORAL EVERY 6 HOURS PRN
Status: CANCELLED | OUTPATIENT
Start: 2023-09-10

## 2023-09-10 RX ORDER — OXYTOCIN/RINGER'S LACTATE 30/500 ML
95 PLASTIC BAG, INJECTION (ML) INTRAVENOUS ONCE AS NEEDED
Status: CANCELLED | OUTPATIENT
Start: 2023-09-10 | End: 2035-02-06

## 2023-09-10 RX ORDER — OXYTOCIN 10 [USP'U]/ML
10 INJECTION, SOLUTION INTRAMUSCULAR; INTRAVENOUS ONCE AS NEEDED
Status: CANCELLED | OUTPATIENT
Start: 2023-09-10 | End: 2035-02-06

## 2023-09-10 RX ORDER — ONDANSETRON 4 MG/1
8 TABLET, ORALLY DISINTEGRATING ORAL EVERY 8 HOURS PRN
Status: CANCELLED | OUTPATIENT
Start: 2023-09-10

## 2023-09-10 RX ORDER — OXYTOCIN/RINGER'S LACTATE 30/500 ML
0-30 PLASTIC BAG, INJECTION (ML) INTRAVENOUS CONTINUOUS
Status: CANCELLED | OUTPATIENT
Start: 2023-09-10

## 2023-09-10 RX ORDER — CALCIUM CARBONATE 200(500)MG
500 TABLET,CHEWABLE ORAL 3 TIMES DAILY PRN
Status: CANCELLED | OUTPATIENT
Start: 2023-09-10

## 2023-09-10 RX ORDER — MORPHINE SULFATE 4 MG/ML
2 INJECTION, SOLUTION INTRAMUSCULAR; INTRAVENOUS
Status: CANCELLED | OUTPATIENT
Start: 2023-09-10

## 2023-09-10 RX ORDER — DIPHENOXYLATE HYDROCHLORIDE AND ATROPINE SULFATE 2.5; .025 MG/1; MG/1
2 TABLET ORAL EVERY 6 HOURS PRN
Status: CANCELLED | OUTPATIENT
Start: 2023-09-10

## 2023-09-10 RX ORDER — LIDOCAINE HYDROCHLORIDE 10 MG/ML
10 INJECTION INFILTRATION; PERINEURAL ONCE AS NEEDED
Status: CANCELLED | OUTPATIENT
Start: 2023-09-10 | End: 2035-02-06

## 2023-09-10 RX ORDER — OXYTOCIN/RINGER'S LACTATE 30/500 ML
95 PLASTIC BAG, INJECTION (ML) INTRAVENOUS ONCE
Status: CANCELLED | OUTPATIENT
Start: 2023-09-10 | End: 2023-09-10

## 2023-09-10 RX ORDER — MISOPROSTOL 100 UG/1
800 TABLET ORAL ONCE AS NEEDED
Status: CANCELLED | OUTPATIENT
Start: 2023-09-10

## 2023-09-10 RX ORDER — CARBOPROST TROMETHAMINE 250 UG/ML
250 INJECTION, SOLUTION INTRAMUSCULAR
Status: CANCELLED | OUTPATIENT
Start: 2023-09-10

## 2023-09-10 RX ORDER — MISOPROSTOL 100 UG/1
800 TABLET ORAL ONCE AS NEEDED
Status: CANCELLED | OUTPATIENT
Start: 2023-09-10 | End: 2035-02-06

## 2023-09-10 RX ORDER — OXYTOCIN/RINGER'S LACTATE 30/500 ML
334 PLASTIC BAG, INJECTION (ML) INTRAVENOUS ONCE AS NEEDED
Status: CANCELLED | OUTPATIENT
Start: 2023-09-10 | End: 2035-02-06

## 2023-09-10 RX ORDER — PROCHLORPERAZINE EDISYLATE 5 MG/ML
5 INJECTION INTRAMUSCULAR; INTRAVENOUS EVERY 6 HOURS PRN
Status: CANCELLED | OUTPATIENT
Start: 2023-09-10

## 2023-09-10 RX ORDER — SIMETHICONE 80 MG
1 TABLET,CHEWABLE ORAL 4 TIMES DAILY PRN
Status: CANCELLED | OUTPATIENT
Start: 2023-09-10

## 2023-09-13 ENCOUNTER — ANESTHESIA (OUTPATIENT)
Dept: OBSTETRICS AND GYNECOLOGY | Facility: HOSPITAL | Age: 28
End: 2023-09-13
Payer: COMMERCIAL

## 2023-09-13 ENCOUNTER — ANESTHESIA EVENT (OUTPATIENT)
Dept: OBSTETRICS AND GYNECOLOGY | Facility: HOSPITAL | Age: 28
End: 2023-09-13
Payer: COMMERCIAL

## 2023-09-13 ENCOUNTER — HOSPITAL ENCOUNTER (INPATIENT)
Facility: HOSPITAL | Age: 28
LOS: 3 days | Discharge: HOME | End: 2023-09-16
Attending: OBSTETRICS & GYNECOLOGY | Admitting: OBSTETRICS & GYNECOLOGY
Payer: COMMERCIAL

## 2023-09-13 VITALS — RESPIRATION RATE: 10 BRPM

## 2023-09-13 DIAGNOSIS — I49.3 PREMATURE VENTRICULAR COMPLEX: ICD-10-CM

## 2023-09-13 DIAGNOSIS — R00.1 BRADYCARDIA: ICD-10-CM

## 2023-09-13 DIAGNOSIS — O36.8390 NON-REASSURING FETAL HEART RATE WITH LATE DECELERATION: Primary | ICD-10-CM

## 2023-09-13 DIAGNOSIS — Z34.90 ENCOUNTER FOR INDUCTION OF LABOR: ICD-10-CM

## 2023-09-13 LAB
ABO + RH BLD: NORMAL
ABO AND RH: NORMAL
ABORH RETYPE: NORMAL
BASOPHILS # BLD AUTO: 0.02 X10(3)/MCL
BASOPHILS NFR BLD AUTO: 0.2 %
EOSINOPHIL # BLD AUTO: 0.04 X10(3)/MCL (ref 0–0.9)
EOSINOPHIL NFR BLD AUTO: 0.5 %
ERYTHROCYTE [DISTWIDTH] IN BLOOD BY AUTOMATED COUNT: 15.5 % (ref 11.5–17)
GROUP & RH: NORMAL
HCT VFR BLD AUTO: 38.7 % (ref 37–47)
HGB BLD-MCNC: 12.3 G/DL (ref 12–16)
IMM GRANULOCYTES # BLD AUTO: 0.03 X10(3)/MCL (ref 0–0.04)
IMM GRANULOCYTES NFR BLD AUTO: 0.4 %
INDIRECT COOMBS GEL: NORMAL
LYMPHOCYTES # BLD AUTO: 1.93 X10(3)/MCL (ref 0.6–4.6)
LYMPHOCYTES NFR BLD AUTO: 22.5 %
MCH RBC QN AUTO: 26.6 PG (ref 27–31)
MCHC RBC AUTO-ENTMCNC: 31.8 G/DL (ref 33–36)
MCV RBC AUTO: 83.8 FL (ref 80–94)
MONOCYTES # BLD AUTO: 0.66 X10(3)/MCL (ref 0.1–1.3)
MONOCYTES NFR BLD AUTO: 7.7 %
NEUTROPHILS # BLD AUTO: 5.88 X10(3)/MCL (ref 2.1–9.2)
NEUTROPHILS NFR BLD AUTO: 68.7 %
NRBC BLD AUTO-RTO: 0 %
PLATELET # BLD AUTO: 288 X10(3)/MCL (ref 130–400)
PMV BLD AUTO: 11.2 FL (ref 7.4–10.4)
PRENATAL STREP B CULTURE: NEGATIVE
RBC # BLD AUTO: 4.62 X10(6)/MCL (ref 4.2–5.4)
SPECIMEN OUTDATE: NORMAL
STREP B PCR (OHS): NOT DETECTED
T PALLIDUM AB SER QL: NONREACTIVE
T4 FREE SERPL-MCNC: 0.69 NG/DL (ref 0.7–1.48)
TSH SERPL-ACNC: 4.61 UIU/ML (ref 0.35–4.94)
WBC # SPEC AUTO: 8.56 X10(3)/MCL (ref 4.5–11.5)

## 2023-09-13 PROCEDURE — 59514 PRA REAN DELIVERY ONLY: ICD-10-PCS | Mod: AA,ANES,, | Performed by: ANESTHESIOLOGY

## 2023-09-13 PROCEDURE — 27200918 HC ALEXIS O RING

## 2023-09-13 PROCEDURE — 99465 NB RESUSCITATION: CPT

## 2023-09-13 PROCEDURE — 63600175 PHARM REV CODE 636 W HCPCS: Performed by: OBSTETRICS & GYNECOLOGY

## 2023-09-13 PROCEDURE — 01968 PR INSERT CATH,ART,PERCUT,SHORTTERM: ICD-10-PCS | Mod: QX,CRNA,, | Performed by: NURSE ANESTHETIST, CERTIFIED REGISTERED

## 2023-09-13 PROCEDURE — 63600175 PHARM REV CODE 636 W HCPCS: Performed by: NURSE ANESTHETIST, CERTIFIED REGISTERED

## 2023-09-13 PROCEDURE — 86923 COMPATIBILITY TEST ELECTRIC: CPT | Performed by: OBSTETRICS & GYNECOLOGY

## 2023-09-13 PROCEDURE — 63600175 PHARM REV CODE 636 W HCPCS: Performed by: ANESTHESIOLOGY

## 2023-09-13 PROCEDURE — 86850 RBC ANTIBODY SCREEN: CPT | Performed by: OBSTETRICS & GYNECOLOGY

## 2023-09-13 PROCEDURE — 59514 CESAREAN DELIVERY ONLY: CPT | Mod: QX,CRNA,, | Performed by: NURSE ANESTHETIST, CERTIFIED REGISTERED

## 2023-09-13 PROCEDURE — 01968 PR INSERT CATH,ART,PERCUT,SHORTTERM: ICD-10-PCS | Mod: QY,ANES,, | Performed by: ANESTHESIOLOGY

## 2023-09-13 PROCEDURE — 85025 COMPLETE CBC W/AUTO DIFF WBC: CPT | Performed by: OBSTETRICS & GYNECOLOGY

## 2023-09-13 PROCEDURE — 36004725 HC OB OR TIME LEV III - EA ADD 15 MIN: Performed by: OBSTETRICS & GYNECOLOGY

## 2023-09-13 PROCEDURE — 37000008 HC ANESTHESIA 1ST 15 MINUTES: Performed by: OBSTETRICS & GYNECOLOGY

## 2023-09-13 PROCEDURE — 25000003 PHARM REV CODE 250: Performed by: ANESTHESIOLOGY

## 2023-09-13 PROCEDURE — 36004724 HC OB OR TIME LEV III - 1ST 15 MIN: Performed by: OBSTETRICS & GYNECOLOGY

## 2023-09-13 PROCEDURE — 71000039 HC RECOVERY, EACH ADD'L HOUR: Performed by: OBSTETRICS & GYNECOLOGY

## 2023-09-13 PROCEDURE — 37000009 HC ANESTHESIA EA ADD 15 MINS: Performed by: OBSTETRICS & GYNECOLOGY

## 2023-09-13 PROCEDURE — 71000033 HC RECOVERY, INTIAL HOUR: Performed by: OBSTETRICS & GYNECOLOGY

## 2023-09-13 PROCEDURE — 59514 CESAREAN DELIVERY ONLY: CPT | Mod: AA,ANES,, | Performed by: ANESTHESIOLOGY

## 2023-09-13 PROCEDURE — 84439 ASSAY OF FREE THYROXINE: CPT | Performed by: OBSTETRICS & GYNECOLOGY

## 2023-09-13 PROCEDURE — 86780 TREPONEMA PALLIDUM: CPT | Performed by: OBSTETRICS & GYNECOLOGY

## 2023-09-13 PROCEDURE — 27000492 HC SLEEVE, SCD T/L

## 2023-09-13 PROCEDURE — 51702 INSERT TEMP BLADDER CATH: CPT

## 2023-09-13 PROCEDURE — 84443 ASSAY THYROID STIM HORMONE: CPT | Performed by: OBSTETRICS & GYNECOLOGY

## 2023-09-13 PROCEDURE — 11000001 HC ACUTE MED/SURG PRIVATE ROOM

## 2023-09-13 PROCEDURE — 72100002 HC LABOR CARE, 1ST 8 HOURS

## 2023-09-13 PROCEDURE — 72100003 HC LABOR CARE, EA. ADDL. 8 HRS

## 2023-09-13 PROCEDURE — 01968 ANES/ANALG CS DLVR NEURAXIAL: CPT | Mod: QX,CRNA,, | Performed by: NURSE ANESTHETIST, CERTIFIED REGISTERED

## 2023-09-13 PROCEDURE — 59514 PRA REAN DELIVERY ONLY: ICD-10-PCS | Mod: QX,CRNA,, | Performed by: NURSE ANESTHETIST, CERTIFIED REGISTERED

## 2023-09-13 PROCEDURE — 01968 ANES/ANALG CS DLVR NEURAXIAL: CPT | Mod: QY,ANES,, | Performed by: ANESTHESIOLOGY

## 2023-09-13 PROCEDURE — 62326 NJX INTERLAMINAR LMBR/SAC: CPT | Performed by: ANESTHESIOLOGY

## 2023-09-13 RX ORDER — METHYLERGONOVINE MALEATE 0.2 MG/ML
200 INJECTION INTRAVENOUS
Status: DISCONTINUED | OUTPATIENT
Start: 2023-09-13 | End: 2023-09-13

## 2023-09-13 RX ORDER — OXYTOCIN/RINGER'S LACTATE 30/500 ML
95 PLASTIC BAG, INJECTION (ML) INTRAVENOUS ONCE AS NEEDED
Status: COMPLETED | OUTPATIENT
Start: 2023-09-13 | End: 2023-09-13

## 2023-09-13 RX ORDER — OXYTOCIN 10 [USP'U]/ML
10 INJECTION, SOLUTION INTRAMUSCULAR; INTRAVENOUS ONCE AS NEEDED
Status: DISCONTINUED | OUTPATIENT
Start: 2023-09-13 | End: 2023-09-13 | Stop reason: SDUPTHER

## 2023-09-13 RX ORDER — CEFAZOLIN SODIUM 2 G/50ML
2 SOLUTION INTRAVENOUS ONCE AS NEEDED
Status: DISCONTINUED | OUTPATIENT
Start: 2023-09-13 | End: 2023-09-13

## 2023-09-13 RX ORDER — MISOPROSTOL 100 UG/1
200 TABLET ORAL ONCE AS NEEDED
Status: DISCONTINUED | OUTPATIENT
Start: 2023-09-13 | End: 2023-09-13

## 2023-09-13 RX ORDER — KETOROLAC TROMETHAMINE 30 MG/ML
30 INJECTION, SOLUTION INTRAMUSCULAR; INTRAVENOUS EVERY 8 HOURS
Status: COMPLETED | OUTPATIENT
Start: 2023-09-14 | End: 2023-09-14

## 2023-09-13 RX ORDER — OXYCODONE AND ACETAMINOPHEN 5; 325 MG/1; MG/1
1 TABLET ORAL EVERY 4 HOURS PRN
Status: DISCONTINUED | OUTPATIENT
Start: 2023-09-13 | End: 2023-09-16 | Stop reason: HOSPADM

## 2023-09-13 RX ORDER — OXYTOCIN/RINGER'S LACTATE 30/500 ML
334 PLASTIC BAG, INJECTION (ML) INTRAVENOUS ONCE AS NEEDED
Status: DISCONTINUED | OUTPATIENT
Start: 2023-09-13 | End: 2023-09-13 | Stop reason: SDUPTHER

## 2023-09-13 RX ORDER — BISACODYL 10 MG
10 SUPPOSITORY, RECTAL RECTAL ONCE AS NEEDED
Status: DISCONTINUED | OUTPATIENT
Start: 2023-09-13 | End: 2023-09-16 | Stop reason: HOSPADM

## 2023-09-13 RX ORDER — ACETAMINOPHEN 10 MG/ML
INJECTION, SOLUTION INTRAVENOUS
Status: DISCONTINUED | OUTPATIENT
Start: 2023-09-13 | End: 2023-09-13

## 2023-09-13 RX ORDER — CALCIUM CARBONATE 200(500)MG
500 TABLET,CHEWABLE ORAL 3 TIMES DAILY PRN
Status: DISCONTINUED | OUTPATIENT
Start: 2023-09-13 | End: 2023-09-14

## 2023-09-13 RX ORDER — MISOPROSTOL 100 UG/1
800 TABLET ORAL ONCE AS NEEDED
Status: DISCONTINUED | OUTPATIENT
Start: 2023-09-13 | End: 2023-09-16 | Stop reason: HOSPADM

## 2023-09-13 RX ORDER — BUPIVACAINE HYDROCHLORIDE 2.5 MG/ML
INJECTION, SOLUTION INFILTRATION; PERINEURAL
Status: DISCONTINUED | OUTPATIENT
Start: 2023-09-13 | End: 2023-09-13

## 2023-09-13 RX ORDER — IBUPROFEN 800 MG/1
800 TABLET ORAL EVERY 8 HOURS
Status: DISCONTINUED | OUTPATIENT
Start: 2023-09-14 | End: 2023-09-13

## 2023-09-13 RX ORDER — METHYLERGONOVINE MALEATE 0.2 MG/ML
200 INJECTION INTRAVENOUS
Status: DISCONTINUED | OUTPATIENT
Start: 2023-09-13 | End: 2023-09-16 | Stop reason: HOSPADM

## 2023-09-13 RX ORDER — PROCHLORPERAZINE EDISYLATE 5 MG/ML
5 INJECTION INTRAMUSCULAR; INTRAVENOUS EVERY 6 HOURS PRN
Status: DISCONTINUED | OUTPATIENT
Start: 2023-09-13 | End: 2023-09-14

## 2023-09-13 RX ORDER — FENTANYL/BUPIVACAINE/NS/PF 2-1250MCG
PLASTIC BAG, INJECTION (ML) INJECTION CONTINUOUS PRN
Status: DISCONTINUED | OUTPATIENT
Start: 2023-09-13 | End: 2023-09-13

## 2023-09-13 RX ORDER — ACETAMINOPHEN 325 MG/1
650 TABLET ORAL EVERY 6 HOURS PRN
Status: DISCONTINUED | OUTPATIENT
Start: 2023-09-13 | End: 2023-09-14

## 2023-09-13 RX ORDER — IBUPROFEN 800 MG/1
800 TABLET ORAL EVERY 8 HOURS
Status: DISCONTINUED | OUTPATIENT
Start: 2023-09-14 | End: 2023-09-16 | Stop reason: HOSPADM

## 2023-09-13 RX ORDER — ONDANSETRON 2 MG/ML
INJECTION INTRAMUSCULAR; INTRAVENOUS
Status: DISCONTINUED | OUTPATIENT
Start: 2023-09-13 | End: 2023-09-13

## 2023-09-13 RX ORDER — KETOROLAC TROMETHAMINE 30 MG/ML
30 INJECTION, SOLUTION INTRAMUSCULAR; INTRAVENOUS EVERY 8 HOURS
Status: DISCONTINUED | OUTPATIENT
Start: 2023-09-13 | End: 2023-09-13

## 2023-09-13 RX ORDER — MIDAZOLAM HYDROCHLORIDE 1 MG/ML
INJECTION INTRAMUSCULAR; INTRAVENOUS
Status: DISCONTINUED | OUTPATIENT
Start: 2023-09-13 | End: 2023-09-13

## 2023-09-13 RX ORDER — OXYTOCIN 10 [USP'U]/ML
10 INJECTION, SOLUTION INTRAMUSCULAR; INTRAVENOUS ONCE AS NEEDED
Status: DISCONTINUED | OUTPATIENT
Start: 2023-09-13 | End: 2023-09-16 | Stop reason: HOSPADM

## 2023-09-13 RX ORDER — CEFAZOLIN SODIUM 1 G/3ML
INJECTION, POWDER, FOR SOLUTION INTRAMUSCULAR; INTRAVENOUS
Status: DISCONTINUED | OUTPATIENT
Start: 2023-09-13 | End: 2023-09-13

## 2023-09-13 RX ORDER — PRENATAL WITH FERROUS FUM AND FOLIC ACID 3080; 920; 120; 400; 22; 1.84; 3; 20; 10; 1; 12; 200; 27; 25; 2 [IU]/1; [IU]/1; MG/1; [IU]/1; MG/1; MG/1; MG/1; MG/1; MG/1; MG/1; UG/1; MG/1; MG/1; MG/1; MG/1
1 TABLET ORAL DAILY
Status: DISCONTINUED | OUTPATIENT
Start: 2023-09-14 | End: 2023-09-16 | Stop reason: HOSPADM

## 2023-09-13 RX ORDER — DOCUSATE SODIUM 100 MG/1
200 CAPSULE, LIQUID FILLED ORAL 2 TIMES DAILY
Status: DISCONTINUED | OUTPATIENT
Start: 2023-09-13 | End: 2023-09-13

## 2023-09-13 RX ORDER — OXYTOCIN 10 [USP'U]/ML
INJECTION, SOLUTION INTRAMUSCULAR; INTRAVENOUS
Status: DISCONTINUED | OUTPATIENT
Start: 2023-09-13 | End: 2023-09-13

## 2023-09-13 RX ORDER — FAMOTIDINE 10 MG/ML
20 INJECTION INTRAVENOUS
Status: DISCONTINUED | OUTPATIENT
Start: 2023-09-13 | End: 2023-09-14

## 2023-09-13 RX ORDER — LIDOCAINE HYDROCHLORIDE 10 MG/ML
10 INJECTION INFILTRATION; PERINEURAL ONCE AS NEEDED
Status: DISCONTINUED | OUTPATIENT
Start: 2023-09-13 | End: 2023-09-13

## 2023-09-13 RX ORDER — HYDROMORPHONE HYDROCHLORIDE 2 MG/ML
1 INJECTION, SOLUTION INTRAMUSCULAR; INTRAVENOUS; SUBCUTANEOUS EVERY 4 HOURS PRN
Status: DISCONTINUED | OUTPATIENT
Start: 2023-09-13 | End: 2023-09-16 | Stop reason: HOSPADM

## 2023-09-13 RX ORDER — DIPHENHYDRAMINE HCL 25 MG
25 CAPSULE ORAL EVERY 4 HOURS PRN
Status: DISCONTINUED | OUTPATIENT
Start: 2023-09-13 | End: 2023-09-16 | Stop reason: HOSPADM

## 2023-09-13 RX ORDER — SODIUM CHLORIDE, SODIUM LACTATE, POTASSIUM CHLORIDE, CALCIUM CHLORIDE 600; 310; 30; 20 MG/100ML; MG/100ML; MG/100ML; MG/100ML
INJECTION, SOLUTION INTRAVENOUS CONTINUOUS
Status: DISCONTINUED | OUTPATIENT
Start: 2023-09-13 | End: 2023-09-13

## 2023-09-13 RX ORDER — BUPIVACAINE HYDROCHLORIDE 2.5 MG/ML
INJECTION, SOLUTION EPIDURAL; INFILTRATION; INTRACAUDAL
Status: COMPLETED | OUTPATIENT
Start: 2023-09-13 | End: 2023-09-13

## 2023-09-13 RX ORDER — ONDANSETRON 4 MG/1
8 TABLET, ORALLY DISINTEGRATING ORAL EVERY 8 HOURS PRN
Status: DISCONTINUED | OUTPATIENT
Start: 2023-09-13 | End: 2023-09-13 | Stop reason: SDUPTHER

## 2023-09-13 RX ORDER — LEVOTHYROXINE SODIUM 112 UG/1
112 TABLET ORAL
Status: DISCONTINUED | OUTPATIENT
Start: 2023-09-14 | End: 2023-09-16 | Stop reason: HOSPADM

## 2023-09-13 RX ORDER — SIMETHICONE 80 MG
1 TABLET,CHEWABLE ORAL 4 TIMES DAILY PRN
Status: DISCONTINUED | OUTPATIENT
Start: 2023-09-13 | End: 2023-09-13 | Stop reason: SDUPTHER

## 2023-09-13 RX ORDER — CARBOPROST TROMETHAMINE 250 UG/ML
250 INJECTION, SOLUTION INTRAMUSCULAR
Status: DISCONTINUED | OUTPATIENT
Start: 2023-09-13 | End: 2023-09-13 | Stop reason: SDUPTHER

## 2023-09-13 RX ORDER — KETOROLAC TROMETHAMINE 30 MG/ML
INJECTION, SOLUTION INTRAMUSCULAR; INTRAVENOUS
Status: DISCONTINUED | OUTPATIENT
Start: 2023-09-13 | End: 2023-09-13

## 2023-09-13 RX ORDER — AMOXICILLIN 250 MG
1 CAPSULE ORAL NIGHTLY PRN
Status: DISCONTINUED | OUTPATIENT
Start: 2023-09-13 | End: 2023-09-16 | Stop reason: HOSPADM

## 2023-09-13 RX ORDER — MISOPROSTOL 100 UG/1
800 TABLET ORAL
Status: DISCONTINUED | OUTPATIENT
Start: 2023-09-13 | End: 2023-09-13

## 2023-09-13 RX ORDER — MORPHINE SULFATE 4 MG/ML
2 INJECTION, SOLUTION INTRAMUSCULAR; INTRAVENOUS
Status: DISCONTINUED | OUTPATIENT
Start: 2023-09-13 | End: 2023-09-13

## 2023-09-13 RX ORDER — OXYTOCIN/RINGER'S LACTATE 30/500 ML
95 PLASTIC BAG, INJECTION (ML) INTRAVENOUS ONCE
Status: DISCONTINUED | OUTPATIENT
Start: 2023-09-13 | End: 2023-09-13

## 2023-09-13 RX ORDER — SIMETHICONE 80 MG
1 TABLET,CHEWABLE ORAL EVERY 6 HOURS PRN
Status: DISCONTINUED | OUTPATIENT
Start: 2023-09-13 | End: 2023-09-16 | Stop reason: HOSPADM

## 2023-09-13 RX ORDER — ONDANSETRON 4 MG/1
8 TABLET, ORALLY DISINTEGRATING ORAL EVERY 8 HOURS PRN
Status: DISCONTINUED | OUTPATIENT
Start: 2023-09-13 | End: 2023-09-16 | Stop reason: HOSPADM

## 2023-09-13 RX ORDER — OXYTOCIN/RINGER'S LACTATE 30/500 ML
334 PLASTIC BAG, INJECTION (ML) INTRAVENOUS ONCE
Status: DISCONTINUED | OUTPATIENT
Start: 2023-09-13 | End: 2023-09-13

## 2023-09-13 RX ORDER — OXYTOCIN/RINGER'S LACTATE 30/500 ML
334 PLASTIC BAG, INJECTION (ML) INTRAVENOUS ONCE AS NEEDED
Status: DISCONTINUED | OUTPATIENT
Start: 2023-09-13 | End: 2023-09-16 | Stop reason: HOSPADM

## 2023-09-13 RX ORDER — DOCUSATE SODIUM 100 MG/1
200 CAPSULE, LIQUID FILLED ORAL 2 TIMES DAILY
Status: DISCONTINUED | OUTPATIENT
Start: 2023-09-14 | End: 2023-09-16 | Stop reason: HOSPADM

## 2023-09-13 RX ORDER — CARBOPROST TROMETHAMINE 250 UG/ML
250 INJECTION, SOLUTION INTRAMUSCULAR
Status: DISCONTINUED | OUTPATIENT
Start: 2023-09-13 | End: 2023-09-16 | Stop reason: HOSPADM

## 2023-09-13 RX ORDER — CARBOPROST TROMETHAMINE 250 UG/ML
250 INJECTION, SOLUTION INTRAMUSCULAR
Status: DISCONTINUED | OUTPATIENT
Start: 2023-09-13 | End: 2023-09-13

## 2023-09-13 RX ORDER — MISOPROSTOL 100 UG/1
800 TABLET ORAL ONCE AS NEEDED
Status: DISCONTINUED | OUTPATIENT
Start: 2023-09-13 | End: 2023-09-13 | Stop reason: SDUPTHER

## 2023-09-13 RX ORDER — OXYCODONE AND ACETAMINOPHEN 10; 325 MG/1; MG/1
1 TABLET ORAL EVERY 4 HOURS PRN
Status: DISCONTINUED | OUTPATIENT
Start: 2023-09-13 | End: 2023-09-16 | Stop reason: HOSPADM

## 2023-09-13 RX ORDER — SODIUM CHLORIDE, SODIUM LACTATE, POTASSIUM CHLORIDE, CALCIUM CHLORIDE 600; 310; 30; 20 MG/100ML; MG/100ML; MG/100ML; MG/100ML
INJECTION, SOLUTION INTRAVENOUS CONTINUOUS
Status: DISCONTINUED | OUTPATIENT
Start: 2023-09-13 | End: 2023-09-14

## 2023-09-13 RX ORDER — FENTANYL CITRATE 50 UG/ML
INJECTION, SOLUTION INTRAMUSCULAR; INTRAVENOUS
Status: DISCONTINUED | OUTPATIENT
Start: 2023-09-13 | End: 2023-09-13

## 2023-09-13 RX ORDER — ADHESIVE BANDAGE
30 BANDAGE TOPICAL 2 TIMES DAILY PRN
Status: DISCONTINUED | OUTPATIENT
Start: 2023-09-14 | End: 2023-09-16 | Stop reason: HOSPADM

## 2023-09-13 RX ORDER — FAMOTIDINE 10 MG/ML
20 INJECTION INTRAVENOUS ONCE
Status: CANCELLED | OUTPATIENT
Start: 2023-09-13 | End: 2023-09-13

## 2023-09-13 RX ORDER — PROMETHAZINE HYDROCHLORIDE 25 MG/1
25 TABLET ORAL EVERY 6 HOURS PRN
Status: DISCONTINUED | OUTPATIENT
Start: 2023-09-13 | End: 2023-09-16 | Stop reason: HOSPADM

## 2023-09-13 RX ORDER — OXYTOCIN/RINGER'S LACTATE 30/500 ML
95 PLASTIC BAG, INJECTION (ML) INTRAVENOUS ONCE AS NEEDED
Status: DISCONTINUED | OUTPATIENT
Start: 2023-09-13 | End: 2023-09-13 | Stop reason: SDUPTHER

## 2023-09-13 RX ORDER — SODIUM CITRATE AND CITRIC ACID MONOHYDRATE 334; 500 MG/5ML; MG/5ML
30 SOLUTION ORAL ONCE
Status: CANCELLED | OUTPATIENT
Start: 2023-09-13 | End: 2023-09-13

## 2023-09-13 RX ORDER — OXYTOCIN/RINGER'S LACTATE 30/500 ML
0-30 PLASTIC BAG, INJECTION (ML) INTRAVENOUS CONTINUOUS
Status: DISCONTINUED | OUTPATIENT
Start: 2023-09-13 | End: 2023-09-13

## 2023-09-13 RX ORDER — DIPHENOXYLATE HYDROCHLORIDE AND ATROPINE SULFATE 2.5; .025 MG/1; MG/1
2 TABLET ORAL EVERY 6 HOURS PRN
Status: DISCONTINUED | OUTPATIENT
Start: 2023-09-13 | End: 2023-09-14

## 2023-09-13 RX ORDER — SODIUM CITRATE AND CITRIC ACID MONOHYDRATE 334; 500 MG/5ML; MG/5ML
30 SOLUTION ORAL
Status: DISCONTINUED | OUTPATIENT
Start: 2023-09-13 | End: 2023-09-13

## 2023-09-13 RX ADMIN — OXYTOCIN 30 UNITS: 10 INJECTION, SOLUTION INTRAMUSCULAR; INTRAVENOUS at 06:09

## 2023-09-13 RX ADMIN — ACETAMINOPHEN 1000 MG: 10 INJECTION, SOLUTION INTRAVENOUS at 06:09

## 2023-09-13 RX ADMIN — BUPIVACAINE HYDROCHLORIDE 10 ML: 2.5 INJECTION, SOLUTION INFILTRATION; PERINEURAL at 05:09

## 2023-09-13 RX ADMIN — AZITHROMYCIN 500 MG: 500 INJECTION, POWDER, LYOPHILIZED, FOR SOLUTION INTRAVENOUS at 05:09

## 2023-09-13 RX ADMIN — Medication 2 MILLI-UNITS/MIN: at 05:09

## 2023-09-13 RX ADMIN — SODIUM CHLORIDE, POTASSIUM CHLORIDE, SODIUM LACTATE AND CALCIUM CHLORIDE: 600; 310; 30; 20 INJECTION, SOLUTION INTRAVENOUS at 11:09

## 2023-09-13 RX ADMIN — HYDROMORPHONE HYDROCHLORIDE 1 MG: 2 INJECTION INTRAMUSCULAR; INTRAVENOUS; SUBCUTANEOUS at 07:09

## 2023-09-13 RX ADMIN — BUPIVACAINE HYDROCHLORIDE 5 ML: 2.5 INJECTION, SOLUTION EPIDURAL; INFILTRATION; INTRACAUDAL; PERINEURAL at 10:09

## 2023-09-13 RX ADMIN — KETOROLAC TROMETHAMINE 30 MG: 30 INJECTION, SOLUTION INTRAMUSCULAR; INTRAVENOUS at 06:09

## 2023-09-13 RX ADMIN — FENTANYL CITRATE 100 MCG: 50 INJECTION, SOLUTION INTRAMUSCULAR; INTRAVENOUS at 05:09

## 2023-09-13 RX ADMIN — SODIUM CHLORIDE, POTASSIUM CHLORIDE, SODIUM LACTATE AND CALCIUM CHLORIDE: 600; 310; 30; 20 INJECTION, SOLUTION INTRAVENOUS at 05:09

## 2023-09-13 RX ADMIN — SODIUM CHLORIDE, POTASSIUM CHLORIDE, SODIUM LACTATE AND CALCIUM CHLORIDE 1000 ML: 600; 310; 30; 20 INJECTION, SOLUTION INTRAVENOUS at 05:09

## 2023-09-13 RX ADMIN — Medication 95 MILLI-UNITS/MIN: at 07:09

## 2023-09-13 RX ADMIN — ONDANSETRON 4 MG: 2 INJECTION INTRAMUSCULAR; INTRAVENOUS at 06:09

## 2023-09-13 RX ADMIN — Medication 10 ML/HR: at 10:09

## 2023-09-13 RX ADMIN — MIDAZOLAM 2 MG: 1 INJECTION INTRAMUSCULAR; INTRAVENOUS at 06:09

## 2023-09-13 RX ADMIN — CEFAZOLIN 2 G: 330 INJECTION, POWDER, FOR SOLUTION INTRAMUSCULAR; INTRAVENOUS at 06:09

## 2023-09-13 NOTE — L&D DELIVERY NOTE
Pre-op Diagnosis:   1.  Intrauterine Pregnancy at 39 4/7 weeks  2.  Nonreassuring fetal heart tones  3. LGA  5. Hypothyroidism    Postop Diagnosis: Same  Procedure: Primary Low Transverse  Section via Pfannenstiel incision  Surgeon: Rosa Monteiro MD  Anesthesia: Epidural  Complications: None  QBL: per RN  Findings: Normal uterus, tubes and ovaries.  Viable male infant with Apgars of 3,9 weighing 9lbs 7oz  Specimen: Placenta    Indication and Consent: Patient presented to L&D for induction. She made progression to 8cm, but encountered recurrent late fetal heart rate decelerations that would not resolve with resuscitative measures.  I discussed risks, benefits and options with her in detail.  She desired to proceed with a primary  delivery. The patient understood that the risks of  section include, but are not limited to, visceral or vascular injury, infection, blood loss and the need for transfusion, prolonged hospitalization and reoperation.  The patient stated understanding and desired to proceed.  All questions were answered.     Procedure: She was taken to the operating room where epidural anesthesia was found to be adequate.  Ancef and Azithro was given for infection prophylaxis.  She was prepped and draped in the dorsal supine position with a leftward tilt.  A Pfannenstiel skin incision was made with the scalpel and carried down to the fascia with the Bovie.  The fascia was incised and extended laterally with the Bovie.  The superior aspect of the fascia was grasped with the Kocher clamps.  The underlying rectus muscle was dissected off sharply with Rushing scissors.  In a similar fashion, the inferior aspect of the fascia was elevated with the Kocher clamps and the rectus and pyramidalis muscles were dissected off.  Hemostasis was achieved with the Bovie.  The rectus muscle was  in the midline down to the level of the pubic symphysis.  Preperitoneal fatty tissue was bluntly  dissected to expose the peritoneum.  The peritoneum was found to be free of adherent bowel and entered sharply with scissors.  The peritoneal incision was extended superiorly and inferiorly to the bladder reflection with good visualization of the bladder.  The Gonzales retractor was placed. The vesicouterine peritoneum identified, then opened with scissors and the bladder flap was developed.   The lower uterine segment was incised with a scalpel.  The amniotic sac was ruptured and clear fluid was noted.  The uterine incision was extended bluntly with lateral and upward traction.  The fetus was in cephalic presentation.  The head was gently elevated out of the pelvis and gentle fundal pressure was applied once the head was brought to the incision.  The infant was delivered without difficulty.  The mouth and nose were suctioned with bulb suction.  The cord was clamped and cut.  The infant was handed off to waiting NICU and respiratory personnel.  IV Pitocin was initiated to facilitate uterine contractions.  The placenta was delivered intact with manual massage of the uterine fundus.  The inside of the uterus was gently wiped with a lap sponge to assure complete removal of placental membranes.  The uterine incision was closed with a 0 Vicryl suture in a running locked fashion.  Blood clots and fluid were wiped out of the abdomen and pelvis with moist lap sponges.  The uterine incision was reinspected and good hemostasis was noted.  The Gonzales retractor was removed.   The peritoneum was closed in a running fashion. The rectus muscles were loosely reapproximated.  The fascia was closed with 1-0 Vicryl in a continuous running fashion.  The subcuticular tissue was irrigated with warm normal saline.  Subcuticular tissue was reapproximated with plain gut.   Skin was closed using Monocryl in a subcuticular fashion.  The patient tolerated the procedure well.  All sponge and lap counts were correct times 2.  The patient was taken  to the recovery room in stable condition.

## 2023-09-13 NOTE — TRANSFER OF CARE
Anesthesia Transfer of Care Note    Patient: Flex Emmanuel    Procedure(s) Performed: * No procedures listed *    Patient location: Labor and Delivery    Anesthesia Type: epidural    Transport from OR: Transported from OR on room air with adequate spontaneous ventilation    Post pain: adequate analgesia    Post assessment: no apparent anesthetic complications and tolerated procedure well    Post vital signs: stable    Level of consciousness: awake and alert    Nausea/Vomiting: no nausea/vomiting    Complications: none    Transfer of care protocol was followed      Last vitals:   Visit Vitals  /86   Pulse 103   Temp 36.9 °C (98.5 °F)   Resp 16   Ht 5' (1.524 m)   Wt 86.2 kg (190 lb)   LMP 12/09/2022   SpO2 98%   Breastfeeding No   BMI 37.11 kg/m²

## 2023-09-13 NOTE — PROGRESS NOTES
S: 28 y.o.  at 39w4d with EDC of 2023, by Patient comfortable with epidural  Recurrent late decels noted.  Not improved with resuscitative measures.     O:   BP: 136/86    FHT: Cat II, recurrent late decels, moderate variability  Winnie/IUPC: q 2 min  SVE:8cm per RN      ASSESSMENT: 39w4d   Patient Active Problem List   Diagnosis    Infertility, female    Hypothyroid    PCOS (polycystic ovarian syndrome)    Pregnancy   Nonreassuring fetal heart tones with recurrent late decels    PLAN:  Findings reviewed with patient and her family along with recommendation for 1LTCS  R/B/A reviewed and questions answered  Ancef/iDleep/SCDs for prophylaxis

## 2023-09-13 NOTE — ANESTHESIA PROCEDURE NOTES
Epidural    Patient location during procedure: OB   Reason for block: primary anesthetic   Reason for block: labor analgesia requested by patient and obstetrician  Diagnosis: active labor   Start time: 9/13/2023 9:43 AM  Timeout: 9/13/2023 9:43 AM  End time: 9/13/2023 10:02 AM    Staffing  Performing Provider: Narendra Wong DO  Authorizing Provider: Narendra Wong DO    Staffing  Performed by: Narendra Wong DO  Authorized by: Narendra Wong DO        Preanesthetic Checklist  Completed: patient identified, IV checked, site marked, risks and benefits discussed, surgical consent, monitors and equipment checked, pre-op evaluation, timeout performed, anesthesia consent given, hand hygiene performed and patient being monitored  Preparation  Patient position: sitting  Prep: ChloraPrep  Patient monitoring: ECG, Pulse Ox and Blood Pressure  Reason for block: primary anesthetic   Epidural  Skin Anesthetic: lidocaine 1%  Skin Wheal: 1 mL  Administration type: single shot  Approach: midline  Interspace: L3-4    Injection technique: JESSE air and JESSE saline  Needle and Epidural Catheter  Needle type: Tuohy   Needle gauge: 17  Needle length: 3.5 inches  Catheter type: multi-orifice  Catheter size: 20 G  Catheter at skin depth: 14 cm  Insertion Attempts: 3  Test dose: 5 mL of lidocaine 1.5% with Epi 1-to-200,000  Additional Documentation: incremental injection, no paresthesia on injection, no significant pain on injection, negative aspiration for heme and CSF, no signs/symptoms of IV or SA injection and no significant complaints from patient  Needle localization: anatomical landmarks  Assessment  Ease of block: moderate  Patient's tolerance of the procedure: comfortable throughout block and no complaints No inadvertent dural puncture with Tuohy.  Dural puncture not performed with spinal needle    Medications:    Medications: bupivacaine (pf) (MARCAINE) injection 0.25% - Epidural, Other   5 mL - 9/13/2023 10:00:00 AM

## 2023-09-13 NOTE — H&P
HISTORY AND PHYSICAL                                                OBSTETRICS          Subjective:      Flex Emmanuel is a 28 y.o.  female with IUP at 39w4d weeks gestation who presents to L&D for induction. Pertinent medical history for this pregnancy includes thyroid disease followed by Dr. Vazquez, PCOS on metformin.  Care this pregnancy has been with Dr. Dozier    PMHx:   Past Medical History:   Diagnosis Date    ADD (attention deficit disorder)     Alopecia     Fatigue     Scoliosis     Thyroid disease     Weight gain        PSHx: No past surgical history on file.    All: Review of patient's allergies indicates:  No Known Allergies    Meds:   Medications Prior to Admission   Medication Sig Dispense Refill Last Dose    levothyroxine (SYNTHROID) 112 MCG tablet Take 112 mcg by mouth once daily.       metFORMIN (GLUCOPHAGE-XR) 750 MG ER 24hr tablet Take 750 mg by mouth 2 (two) times daily.          SH:   Social History     Socioeconomic History    Marital status:    Tobacco Use    Smoking status: Never    Smokeless tobacco: Never   Substance and Sexual Activity    Alcohol use: Never    Drug use: Never    Sexual activity: Yes     Partners: Male     Birth control/protection: None   Social History Narrative    ** Merged History Encounter **            FH:   Family History   Problem Relation Age of Onset    Cancer Mother     Heart disease Father     Stroke Paternal Grandfather        OBHx:   OB History    Para Term  AB Living   1 0 0 0 0 0   SAB IAB Ectopic Multiple Live Births   0 0 0 0 0      # Outcome Date GA Lbr Celso/2nd Weight Sex Delivery Anes PTL Lv   1 Current                Objective:      /86   Pulse 103   Temp 98.4 °F (36.9 °C)   Resp 16   Ht 5' (1.524 m)   Wt 86.2 kg (190 lb)   LMP 2022 Comment: Possibility of pregnancy  SpO2 98%   Breastfeeding No   BMI 37.11 kg/m²   Body mass index is 37.11 kg/m².    General:   alert and cooperative   HEENT:   normocephalic, atraumatic   Lungs:   clear to auscultation bilaterally   Heart:   regular rate and rhythm, S1, S2 normal   Abdomen:  gravid, non-tender   Extremities non-tender, no edema   Derm: no rashes or lesions   Psych: appropriate mood and affect   Pelvis:  adequate         Lab Review  GBS: negative      Assessment:     28 y.o.  at 39w4d weeks gestation.  Hashimoto's  PCOS on metformin    There are no hospital problems to display for this patient.         Plan:     1. Risks, benefits, alternatives and possible complications have been discussed in detail with the patient. All questions have been answered, and Ms. Emmanuel has voiced understanding and agrees to the treatment plan.  2. Consents signed and in chart  3. Admit to Labor and Delivery unit  4. Patient with frequent PVCs on admit, confirmed with EKG. TSH elevated at 4.6, free T4 pending.  Patient asymptomatic. Reports that she has been forgetting to take meds. Counseled on importance of compliance with thyroid meds.   5. Pitocin induction

## 2023-09-13 NOTE — DISCHARGE INSTRUCTIONS
"Postpartum Discharge Instructions:    No heavy lifting  Pelvic rest--no douching, tampons, or intercourse until released by MD  Notify MD if bleeding becomes heavier than usual and if large clots or consistently saturating a pad an hour, painful cramping,or foul odor develops.   Vaginal discharge will lighten and decrease in amount gradually.    Cleanse perineal area from front to back after urination or having a bowel movement.  Tub soak or portable sitz bath at home.  Apply clean pad and Tucks to perineal area often  Episiotomy stitches will dissolve within 4-6 weeks  If not breastfeeding, wear tight fitting sports bra   Notify MD if breast become reddened,swollen, nipples bleed or crack, or fever greater than 100.4  Notify MD of pain, swelling, redness, or heat developing in back of leg especially when foot is flexed toward body  Look at incision everyday for redness, swelling, or drainage which may indicate infection  Notify MD of pain not relieved by pain medication.  Clean your abdominal incision at least once daily with warm water and soap. Keep  it dry. May use a blow dryer to aid in drying the area.   Call MD or go to ER for any concerns     [facility]        [phone]  Discharge Instructions    Watch for early feeding cues (rooting, hand to mouth, smacking lips, sticking out tongue). Offer the breast at the first signs of hunger. Crying is a late sign of hunger; don't wait until then.    Feed your baby at least 8-12 times in a 24-hour period. Feeding early and often will ensure a plentiful milk supply for you and your baby and will prevent engorgement in the coming days.  Do not limit or schedule feedings.    "Cluster feeding" is normal; baby may nurse very often for several times in a row. This commonly occurs in the evening or early part of the night.    Allow your baby to finish one side before offering the other. You can try to burp the baby and then offer the other breast if he/ she seems " to still be hungry.     Skin to skin contact helps a sleepy baby want to nurse. Babies who are frequently held skin to skin nurse better and longer. Skin to skin increases mom's milk-making hormone levels as well. Skin to skin can help calm baby too.     By the end of the first week, you want to see 6-8 wet diapers per day and 3-5 yellow, seedy stools (stools will change from black to green to yellow by the end of the 1st week. Refer to chart in breastfeeding booklet to see how many wet/ dirty diapers baby should be having each day. Notify pediatrician if baby is not having enough wet and dirty diapers.    It is best to avoid bottles and pacifiers for the first 4 weeks while getting breastfeeding established.     Back to work or school: 4 weeks is a good time to start pumping after morning feeds in order to store milk for baby, although you may pump before if needed. Around 4-6 weeks is a good time to introduce a bottle of pumped milk to baby if you will go back to work or school.     You should feel a tugging or pulling sensation when your baby nurses; it should never feel sharp, pinching, or singing. If there is pain, try to adjust the latch. Make sure your baby opens his mouth wide to latch on. His lips should be flanged out, like a fish. (You may want to refer to the handouts in your packet or view latch videos at [employer] or [employer].    Listen for swallowing. This indicates your baby is transferring that milk!     Your milk will increase between days 3-5. Frequent feeds can help with engorgement.     If your breasts begin to get engorged, place warm cloths on them or  a warm shower before feeding. This will help the milk begin to flow. Feed often to drain the breasts. After feeding, you may use cold packs for 10-15 minutes to reduce swelling. You may also want to pump for comfort; don't overdo it- just pump enough to relieve the fullness.     No soap or lotions to the nipples  except for medical grade lanolin or nipple cream for soreness.     All babies go through growth spurts. The first one is generally around 2-3 weeks. If your baby starts to nurse a lot more than usual, this is likely the reason. Growth spurts happen every so often and usually last for 3-5 days.     Remember to check the safety of any medications, prescription or non-prescription (including herbals), before you take them. Your baby's pediatrician is the best one to confirm the safety of the medication while you are breastfeeding. You may also phone us. We can tell you about safety ratings that have been published regarding a particular medication. You may wish to phone the [facility] at [phone] to check the safety of a medication.     Call with any questions or concerns. Don't wait-- ask for help early. Breastfeeding Resources can be found on the last few pages of your Breastfeeding Booklet given to you in the hospital.

## 2023-09-13 NOTE — ANESTHESIA PREPROCEDURE EVALUATION
2023  Flex Emmanuel is a 28 y.o., female  at Term in Labor. .    She and her physician request Epidural for pain.      PMHx:  Other Medical History   ADD (attention deficit disorder) Alopecia   Fatigue Scoliosis   Weight gain Thyroid disease     Surgical History:  No surgical history recorded           Vital signs:  Pre Vitals     Current as of 23 09  BP: 136/86 Pulse: 103   Resp:  SpO2: 98   Temp: 36.9 °C (98.4 °F)   Height: 5' (1.524 m) (23) Weight: 86.2 kg (190 lb) (23)   BMI: 37.1 IBW: 45.5 kg (100 lb 4.9 oz)   Last edited 23 by JENNY          Lab Data:        Pre-op Assessment    I have reviewed the Patient Summary Reports.     I have reviewed the Nursing Notes. I have reviewed the NPO Status.   I have reviewed the Medications.     Review of Systems  Anesthesia Hx:  No problems with previous Anesthesia    Social:  Non-Smoker    Hematology/Oncology:  Hematology Normal   Oncology Normal     EENT/Dental:EENT/Dental Normal   Cardiovascular:  Cardiovascular Normal Exercise tolerance: good   Functional Capacity good / => 4 METS    Pulmonary:  Pulmonary Normal    Renal/:  Renal/ Normal     Hepatic/GI:  Hepatic/GI Normal    Musculoskeletal:  Musculoskeletal Normal    OB/GYN/PEDS:     Neurological:  Neurology Normal    Endocrine:  Endocrine Normal  Obesity / BMI > 30  Dermatological:  Skin Normal    Psych:   Psychiatric History ADHD         Physical Exam  General: Well nourished, Cooperative, Alert and Oriented    Airway:  Mallampati: I   Mouth Opening: Normal  TM Distance: Normal  Tongue: Normal    Dental:  Intact        Anesthesia Plan  Type of Anesthesia, risks & benefits discussed:    Anesthesia Type: Epidural  Intra-op Monitoring Plan: Standard ASA Monitors  Informed Consent: Informed consent signed with the Patient and all parties understand the risks and  agree with anesthesia plan.  All questions answered.   ASA Score: 2  Day of Surgery Review of History & Physical: H&P Update referred to the surgeon/provider.    Ready For Surgery From Anesthesia Perspective.     .

## 2023-09-14 LAB
ALBUMIN SERPL-MCNC: 2.2 G/DL (ref 3.5–5)
ALBUMIN/GLOB SERPL: 0.8 RATIO (ref 1.1–2)
ALP SERPL-CCNC: 119 UNIT/L (ref 40–150)
ALT SERPL-CCNC: 11 UNIT/L (ref 0–55)
AST SERPL-CCNC: 22 UNIT/L (ref 5–34)
AV INDEX (PROSTH): 0.79
AV MEAN GRADIENT: 6 MMHG
AV PEAK GRADIENT: 10 MMHG
AV VALVE AREA BY VELOCITY RATIO: 2.39 CM²
AV VALVE AREA: 2.48 CM²
AV VELOCITY RATIO: 0.76
BASOPHILS # BLD AUTO: 0.04 X10(3)/MCL
BASOPHILS NFR BLD AUTO: 0.3 %
BILIRUB SERPL-MCNC: 0.3 MG/DL
BSA FOR ECHO PROCEDURE: 1.91 M2
BUN SERPL-MCNC: 4.6 MG/DL (ref 7–18.7)
CALCIUM SERPL-MCNC: 8.5 MG/DL (ref 8.4–10.2)
CHLORIDE SERPL-SCNC: 109 MMOL/L (ref 98–107)
CO2 SERPL-SCNC: 23 MMOL/L (ref 22–29)
CREAT SERPL-MCNC: 0.67 MG/DL (ref 0.55–1.02)
CV ECHO LV RWT: 0.37 CM
DOP CALC AO PEAK VEL: 1.59 M/S
DOP CALC AO VTI: 28 CM
DOP CALC LVOT AREA: 3.1 CM2
DOP CALC LVOT DIAMETER: 2 CM
DOP CALC LVOT PEAK VEL: 1.21 M/S
DOP CALC LVOT STROKE VOLUME: 69.39 CM3
DOP CALC MV VTI: 20.9 CM
DOP CALCLVOT PEAK VEL VTI: 22.1 CM
E WAVE DECELERATION TIME: 127 MSEC
E/A RATIO: 1.85
E/E' RATIO: 6.28 M/S
ECHO LV POSTERIOR WALL: 0.92 CM (ref 0.6–1.1)
EJECTION FRACTION: 50 %
EOSINOPHIL # BLD AUTO: 0.03 X10(3)/MCL (ref 0–0.9)
EOSINOPHIL NFR BLD AUTO: 0.3 %
ERYTHROCYTE [DISTWIDTH] IN BLOOD BY AUTOMATED COUNT: 15.7 % (ref 11.5–17)
FRACTIONAL SHORTENING: 27 % (ref 28–44)
GFR SERPLBLD CREATININE-BSD FMLA CKD-EPI: >60 MLS/MIN/1.73/M2
GLOBULIN SER-MCNC: 2.8 GM/DL (ref 2.4–3.5)
GLUCOSE SERPL-MCNC: 137 MG/DL (ref 74–100)
HCT VFR BLD AUTO: 30 % (ref 37–47)
HGB BLD-MCNC: 9.7 G/DL (ref 12–16)
IMM GRANULOCYTES # BLD AUTO: 0.04 X10(3)/MCL (ref 0–0.04)
IMM GRANULOCYTES NFR BLD AUTO: 0.3 %
INTERVENTRICULAR SEPTUM: 0.91 CM (ref 0.6–1.1)
LEFT ATRIUM SIZE: 3.3 CM
LEFT ATRIUM VOLUME INDEX MOD: 34.2 ML/M2
LEFT ATRIUM VOLUME MOD: 62.3 CM3
LEFT INTERNAL DIMENSION IN SYSTOLE: 3.66 CM (ref 2.1–4)
LEFT VENTRICLE DIASTOLIC VOLUME INDEX: 65.38 ML/M2
LEFT VENTRICLE DIASTOLIC VOLUME: 119 ML
LEFT VENTRICLE MASS INDEX: 89 G/M2
LEFT VENTRICLE SYSTOLIC VOLUME INDEX: 31.1 ML/M2
LEFT VENTRICLE SYSTOLIC VOLUME: 56.6 ML
LEFT VENTRICULAR INTERNAL DIMENSION IN DIASTOLE: 5.01 CM (ref 3.5–6)
LEFT VENTRICULAR MASS: 162.23 G
LV LATERAL E/E' RATIO: 4.91 M/S
LV SEPTAL E/E' RATIO: 8.69 M/S
LVOT MG: 3 MMHG
LVOT MV: 0.82 CM/S
LYMPHOCYTES # BLD AUTO: 1.33 X10(3)/MCL (ref 0.6–4.6)
LYMPHOCYTES NFR BLD AUTO: 11.3 %
MAGNESIUM SERPL-MCNC: 1.6 MG/DL (ref 1.6–2.6)
MCH RBC QN AUTO: 26.8 PG (ref 27–31)
MCHC RBC AUTO-ENTMCNC: 32.3 G/DL (ref 33–36)
MCV RBC AUTO: 82.9 FL (ref 80–94)
MONOCYTES # BLD AUTO: 0.7 X10(3)/MCL (ref 0.1–1.3)
MONOCYTES NFR BLD AUTO: 5.9 %
MV MEAN GRADIENT: 2 MMHG
MV PEAK A VEL: 0.61 M/S
MV PEAK E VEL: 1.13 M/S
MV PEAK GRADIENT: 3 MMHG
MV VALVE AREA BY CONTINUITY EQUATION: 3.32 CM2
NEUTROPHILS # BLD AUTO: 9.63 X10(3)/MCL (ref 2.1–9.2)
NEUTROPHILS NFR BLD AUTO: 81.9 %
NRBC BLD AUTO-RTO: 0 %
OHS LV EJECTION FRACTION SIMPSONS BIPLANE MOD: 59 %
PISA TR MAX VEL: 2.13 M/S
PLATELET # BLD AUTO: 214 X10(3)/MCL (ref 130–400)
PMV BLD AUTO: 11.3 FL (ref 7.4–10.4)
POTASSIUM SERPL-SCNC: 4.3 MMOL/L (ref 3.5–5.1)
PROT SERPL-MCNC: 5 GM/DL (ref 6.4–8.3)
RA MAJOR: 4.47 CM
RA WIDTH: 3.25 CM
RBC # BLD AUTO: 3.62 X10(6)/MCL (ref 4.2–5.4)
RIGHT VENTRICULAR END-DIASTOLIC DIMENSION: 2.41 CM
SODIUM SERPL-SCNC: 138 MMOL/L (ref 136–145)
T3FREE SERPL-MCNC: 2.3 PG/ML (ref 1.58–3.91)
TDI LATERAL: 0.23 M/S
TDI SEPTAL: 0.13 M/S
TDI: 0.18 M/S
TR MAX PG: 18 MMHG
TRICUSPID ANNULAR PLANE SYSTOLIC EXCURSION: 2.09 CM
WBC # SPEC AUTO: 11.77 X10(3)/MCL (ref 4.5–11.5)
Z-SCORE OF LEFT VENTRICULAR DIMENSION IN END DIASTOLE: -0.06
Z-SCORE OF LEFT VENTRICULAR DIMENSION IN END SYSTOLE: 1.28

## 2023-09-14 PROCEDURE — 93010 ELECTROCARDIOGRAM REPORT: CPT | Mod: ,,, | Performed by: INTERNAL MEDICINE

## 2023-09-14 PROCEDURE — 25000003 PHARM REV CODE 250: Performed by: OBSTETRICS & GYNECOLOGY

## 2023-09-14 PROCEDURE — 85025 COMPLETE CBC W/AUTO DIFF WBC: CPT | Performed by: OBSTETRICS & GYNECOLOGY

## 2023-09-14 PROCEDURE — 11000001 HC ACUTE MED/SURG PRIVATE ROOM

## 2023-09-14 PROCEDURE — 93010 EKG 12-LEAD: ICD-10-PCS | Mod: ,,, | Performed by: INTERNAL MEDICINE

## 2023-09-14 PROCEDURE — 80053 COMPREHEN METABOLIC PANEL: CPT

## 2023-09-14 PROCEDURE — 84481 FREE ASSAY (FT-3): CPT

## 2023-09-14 PROCEDURE — 63600175 PHARM REV CODE 636 W HCPCS: Performed by: OBSTETRICS & GYNECOLOGY

## 2023-09-14 PROCEDURE — 93005 ELECTROCARDIOGRAM TRACING: CPT

## 2023-09-14 PROCEDURE — 83735 ASSAY OF MAGNESIUM: CPT

## 2023-09-14 RX ORDER — LEVOTHYROXINE SODIUM 112 UG/1
112 TABLET ORAL DAILY
Status: DISCONTINUED | OUTPATIENT
Start: 2023-09-14 | End: 2023-09-14 | Stop reason: SDUPTHER

## 2023-09-14 RX ADMIN — IBUPROFEN 800 MG: 600 TABLET ORAL at 10:09

## 2023-09-14 RX ADMIN — KETOROLAC TROMETHAMINE 30 MG: 30 INJECTION, SOLUTION INTRAMUSCULAR at 05:09

## 2023-09-14 RX ADMIN — LEVOTHYROXINE SODIUM 112 MCG: 112 TABLET ORAL at 05:09

## 2023-09-14 RX ADMIN — KETOROLAC TROMETHAMINE 30 MG: 30 INJECTION, SOLUTION INTRAMUSCULAR at 02:09

## 2023-09-14 RX ADMIN — SODIUM CHLORIDE, POTASSIUM CHLORIDE, SODIUM LACTATE AND CALCIUM CHLORIDE: 600; 310; 30; 20 INJECTION, SOLUTION INTRAVENOUS at 01:09

## 2023-09-14 RX ADMIN — OXYCODONE HYDROCHLORIDE AND ACETAMINOPHEN 1 TABLET: 5; 325 TABLET ORAL at 08:09

## 2023-09-14 RX ADMIN — OXYCODONE HYDROCHLORIDE AND ACETAMINOPHEN 1 TABLET: 5; 325 TABLET ORAL at 10:09

## 2023-09-14 RX ADMIN — KETOROLAC TROMETHAMINE 30 MG: 30 INJECTION, SOLUTION INTRAMUSCULAR at 12:09

## 2023-09-14 RX ADMIN — METFORMIN HYDROCHLORIDE 750 MG: 500 TABLET, FILM COATED ORAL at 07:09

## 2023-09-14 RX ADMIN — DOCUSATE SODIUM 200 MG: 100 CAPSULE, LIQUID FILLED ORAL at 10:09

## 2023-09-14 NOTE — PROGRESS NOTES
PostPartum Progress Note        Subjective:      Post-Operative Day #1 after  delivery secondary to non reassuring FHT .    Patient is without complaints. Lochia less than menses. Breast feeding. Pain is well controlled. Patient is not ambulating yet, DTV.Tolerating Clear Liquid diet.Overall mother and baby are doing well.     Objective:      Temp:  [97.9 °F (36.6 °C)-98.8 °F (37.1 °C)] 98 °F (36.7 °C)  Pulse:  [] 80  Resp:  [10-22] 18  SpO2:  [98 %-99 %] 98 %  BP: ()/(62-83) 127/83    Intake/Output Summary (Last 24 hours) at 2023 0825  Last data filed at 2023 0240  Gross per 24 hour   Intake 427.83 ml   Output 2395 ml   Net -1967.17 ml     Body mass index is 37.11 kg/m².    General: no acute distress  Abdomen: soft, non-tender, non-distended; Fundus firm and at the umbilicus     Extremities: non-tender, symmetric, trace edema    Group & Rh   Date Value Ref Range Status   2023 A POS  Final   2023 A POS  Final     Recent Results (from the past 336 hour(s))   CBC with Differential    Collection Time: 23  6:06 AM   Result Value Ref Range    WBC 11.77 (H) 4.50 - 11.50 x10(3)/mcL    Hgb 9.7 (L) 12.0 - 16.0 g/dL    Hct 30.0 (L) 37.0 - 47.0 %    Platelet 214 130 - 400 x10(3)/mcL   CBC with Differential    Collection Time: 23  4:36 AM   Result Value Ref Range    WBC 8.56 4.50 - 11.50 x10(3)/mcL    Hgb 12.3 12.0 - 16.0 g/dL    Hct 38.7 37.0 - 47.0 %    Platelet 288 130 - 400 x10(3)/mcL          Assessment:     28 y.o.  S/P  Delivery Post-Operative Day #1  - Doing Well . Due to ambulate and void. Multiple PVC noted on 12 lead that was ordered for irregular heart rate. Pt is hypothyroid and admits to not taking medications regularly as prescribed. Rec cardiology consult per Dr. Monteiro rec.      Plan:     1. Continue routine postpartum care  2. Cardiology consult  3. Plan for D/C dyay

## 2023-09-14 NOTE — CONSULTS
Inpatient consult to Cardiology  Consult performed by: Kathryn Salas FNP  Consult ordered by: Charisse Lucio WHNP  Reason for consult: Irregular Heart Rate w/ PVC's        [facility] - 2nd Floor Mother/Baby Unit    Cardiology  Consult Note    Patient Name: Flex Emmanuel  MRN: [id number]  Admission Date: 2023  Hospital Length of Stay: 1 days  Code Status: Prior   Attending Provider: Rosa Monteiro MD   Consulting Provider: TERRI Macias  Primary Care Physician: Damion Rae MD  Principal Problem: delivery delivered    Patient information was obtained from patient, past medical records, and ER records.     Subjective:     Reason for Consult: Abnormal EKG     HPI:   Ms. Emmanuel is a 28 year old female who is unknown to [facility]. She presents to [facility] on 23 and underwent a successful  s/t non reassuring FHT. She denies CP, SOB, or palps but endorses surgical incision pain. An EKG was obtained and demonstrated NSR w/ frequent PVC's. She reports that she has not been taking her thyroid medication as prescribed. [facility] has been consulted to further evaluate her abnormal EKG & frequent PVC's.     PMH: ADD, Alopecia, Scoliosis, Thyroid Disease  PSH:    Family History: Mother - CA; Father - heart disease  Social History: Denies alcohol, tobacco, or illicit drug use.     Previous Cardiac Diagnostics:   None to review.    Review of patient's allergies indicates:  No Known Allergies    No current facility-administered medications on file prior to encounter.     Current Outpatient Medications on File Prior to Encounter   Medication Sig    levothyroxine (SYNTHROID) 112 MCG tablet Take 112 mcg by mouth once daily.    metFORMIN (GLUCOPHAGE-XR) 750 MG ER 24hr tablet Take 750 mg by mouth 2 (two) times daily.     Review of Systems   Respiratory:  Negative for shortness of breath.    Cardiovascular:  Negative for chest pain and palpitations.    Gastrointestinal:         +Surgical Incision Pain        Objective:     Vital Signs (Most Recent):  Temp: 98 °F (36.7 °C) (09/14/23 0743)  Pulse: 80 (09/14/23 0743)  Resp: 18 (09/14/23 0743)  BP: 127/83 (09/14/23 0743)  SpO2: 98 % (09/13/23 1915) Vital Signs (24h Range):  Temp:  [97.9 °F (36.6 °C)-98.8 °F (37.1 °C)] 98 °F (36.7 °C)  Pulse:  [] 80  Resp:  [10-22] 18  SpO2:  [98 %-99 %] 98 %  BP: ()/(62-83) 127/83     Weight: 86.2 kg (190 lb)  Body mass index is 37.11 kg/m².    SpO2: 98 %         Intake/Output Summary (Last 24 hours) at 9/14/2023 0946  Last data filed at 9/14/2023 0240  Gross per 24 hour   Intake 427.83 ml   Output 2395 ml   Net -1967.17 ml       Lines/Drains/Airways       Drain  Duration                  Urethral Catheter 09/13/23 1055 Non-latex 16 Fr. <1 day              Peripheral Intravenous Line  Duration                  Peripheral IV - Single Lumen 09/13/23 0430 18 G Anterior;Left;Proximal Forearm 1 day                    Significant Labs:  Recent Results (from the past 72 hour(s))   Strep Group B by PCR    Collection Time: 09/13/23 12:00 AM   Result Value Ref Range    STREP B PCR ([facility]) Not Detected Not Detected   Strep B Screen, Vaginal / Rectal    Collection Time: 09/13/23 12:00 AM    Specimen: Vaginal/Rectal   Result Value Ref Range    Strep B Culture Negative    Type & Screen    Collection Time: 09/13/23 12:00 AM   Result Value Ref Range    ABO and RH A Neg    Type & Screen    Collection Time: 09/13/23 12:00 AM   Result Value Ref Range    ABO and RH A Pos    ABO/Rh    Collection Time: 09/13/23 12:00 AM   Result Value Ref Range    Group & Rh A POS    Type & Screen    Collection Time: 09/13/23 12:00 AM   Result Value Ref Range    ABO and RH A POS    Type & Screen    Collection Time: 09/13/23  4:36 AM   Result Value Ref Range    Group & Rh A POS     Indirect Keira GEL NEG     Specimen Outdate 09/16/2023 23:59    SYPHILIS ANTIBODY (WITH REFLEX RPR)    Collection Time: 09/13/23   4:36 AM   Result Value Ref Range    Syphilis Antibody Nonreactive Nonreactive, Equivocal   CBC with Differential    Collection Time: 09/13/23  4:36 AM   Result Value Ref Range    WBC 8.56 4.50 - 11.50 x10(3)/mcL    RBC 4.62 4.20 - 5.40 x10(6)/mcL    Hgb 12.3 12.0 - 16.0 g/dL    Hct 38.7 37.0 - 47.0 %    MCV 83.8 80.0 - 94.0 fL    MCH 26.6 (L) 27.0 - 31.0 pg    MCHC 31.8 (L) 33.0 - 36.0 g/dL    RDW 15.5 11.5 - 17.0 %    Platelet 288 130 - 400 x10(3)/mcL    MPV 11.2 (H) 7.4 - 10.4 fL    Neut % 68.7 %    Lymph % 22.5 %    Mono % 7.7 %    Eos % 0.5 %    Basophil % 0.2 %    Lymph # 1.93 0.6 - 4.6 x10(3)/mcL    Neut # 5.88 2.1 - 9.2 x10(3)/mcL    Mono # 0.66 0.1 - 1.3 x10(3)/mcL    Eos # 0.04 0 - 0.9 x10(3)/mcL    Baso # 0.02 <=0.2 x10(3)/mcL    IG# 0.03 0 - 0.04 x10(3)/mcL    IG% 0.4 %    NRBC% 0.0 %   TSH    Collection Time: 09/13/23  4:36 AM   Result Value Ref Range    Thyroid Stimulating Hormone 4.606 0.350 - 4.940 uIU/mL   Prepare RBC 2 Units; High postpartum risk    Collection Time: 09/13/23  4:36 AM   Result Value Ref Range    UNIT NUMBER [id number]     UNIT ABO/RH A POS     DISPENSE STATUS Selected     Unit Expiration [id number]     Product Code O2958K23     Unit Blood Type Code 6200     CROSSMATCH INTERPRETATION Compatible     UNIT NUMBER [id number]     UNIT ABO/RH A POS     DISPENSE STATUS Selected     Unit Expiration [id number]     Product Code L1025K29     Unit Blood Type Code 6200     CROSSMATCH INTERPRETATION Compatible    ABORH RETYPE    Collection Time: 09/13/23  7:29 AM   Result Value Ref Range    ABORH Retype A POS    T4, Free    Collection Time: 09/13/23  7:35 AM   Result Value Ref Range    Thyroxine Free 0.69 (L) 0.70 - 1.48 ng/dL   CBC with Differential    Collection Time: 09/14/23  6:06 AM   Result Value Ref Range    WBC 11.77 (H) 4.50 - 11.50 x10(3)/mcL    RBC 3.62 (L) 4.20 - 5.40 x10(6)/mcL    Hgb 9.7 (L) 12.0 - 16.0 g/dL    Hct 30.0 (L) 37.0 - 47.0 %    MCV 82.9 80.0 - 94.0 fL    MCH  26.8 (L) 27.0 - 31.0 pg    MCHC 32.3 (L) 33.0 - 36.0 g/dL    RDW 15.7 11.5 - 17.0 %    Platelet 214 130 - 400 x10(3)/mcL    MPV 11.3 (H) 7.4 - 10.4 fL    Neut % 81.9 %    Lymph % 11.3 %    Mono % 5.9 %    Eos % 0.3 %    Basophil % 0.3 %    Lymph # 1.33 0.6 - 4.6 x10(3)/mcL    Neut # 9.63 (H) 2.1 - 9.2 x10(3)/mcL    Mono # 0.70 0.1 - 1.3 x10(3)/mcL    Eos # 0.03 0 - 0.9 x10(3)/mcL    Baso # 0.04 <=0.2 x10(3)/mcL    IG# 0.04 0 - 0.04 x10(3)/mcL    IG% 0.3 %    NRBC% 0.0 %       Significant Imaging:      EKG:        Telemetry:  Not on tele    Physical Exam  HENT:      Head: Normocephalic.      Nose: Nose normal.      Mouth/Throat:      Mouth: Mucous membranes are dry.   Eyes:      Extraocular Movements: Extraocular movements intact.   Cardiovascular:      Rate and Rhythm: Normal rate and regular rhythm.      Pulses: Normal pulses.      Heart sounds: Normal heart sounds.   Pulmonary:      Effort: Pulmonary effort is normal.      Breath sounds: Normal breath sounds.   Abdominal:      Palpations: Abdomen is soft.   Musculoskeletal:      Right lower leg: Edema present.      Left lower leg: Edema present.   Skin:     General: Skin is warm and dry.      Comments: + Abdominal Transverse Surgical Incision    Neurological:      Mental Status: She is alert and oriented to person, place, and time.   Psychiatric:         Behavior: Behavior normal.         Home Medications:   No current facility-administered medications on file prior to encounter.     Current Outpatient Medications on File Prior to Encounter   Medication Sig Dispense Refill    levothyroxine (SYNTHROID) 112 MCG tablet Take 112 mcg by mouth once daily.      metFORMIN (GLUCOPHAGE-XR) 750 MG ER 24hr tablet Take 750 mg by mouth 2 (two) times daily.         Current Inpatient Medications:    Current Facility-Administered Medications:     bisacodyL suppository 10 mg, 10 mg, Rectal, Once PRN, Rosa Monteiro MD    carboprost injection 250 mcg, 250 mcg, Intramuscular, Q15  Min PRN, Rosa Monteiro MD    diphenhydrAMINE capsule 25 mg, 25 mg, Oral, Q4H PRN, Rosa Monteiro MD    docusate sodium capsule 200 mg, 200 mg, Oral, BID, Rosa Monteiro MD    HYDROmorphone (PF) injection 1 mg, 1 mg, Intravenous, Q4H PRN, Rosa Monteiro MD, 1 mg at 09/13/23 1930    ketorolac injection 30 mg, 30 mg, Intravenous, Q8H, 30 mg at 09/14/23 0544 **FOLLOWED BY** ibuprofen tablet 800 mg, 800 mg, Oral, Q8H, Rosa Monteiro MD    lanolin cream, , Topical (Top), PRN, Rosa Monteiro MD    levothyroxine tablet 112 mcg, 112 mcg, Oral, Before breakfast, Rosa Monteiro MD, 112 mcg at 09/14/23 0544    magnesium hydroxide 400 mg/5 ml suspension 2,400 mg, 30 mL, Oral, BID PRN, Rosa Monteiro MD    metformin split tablet 750 mg, 750 mg, Oral, BID WM, Rosa Monteiro MD    methylergonovine injection 200 mcg, 200 mcg, Intramuscular, Q2H PRN, Rosa Monteiro MD    miSOPROStoL tablet 800 mcg, 800 mcg, Rectal, Once PRN, Rosa Monteiro MD    miSOPROStoL tablet 800 mcg, 800 mcg, Oral, Once PRN, Rosa Monteiro MD    ondansetron disintegrating tablet 8 mg, 8 mg, Oral, Q8H PRN, Rosa Monteiro MD    oxyCODONE-acetaminophen  mg per tablet 1 tablet, 1 tablet, Oral, Q4H PRNThania Amanda L., MD    oxyCODONE-acetaminophen 5-325 mg per tablet 1 tablet, 1 tablet, Oral, Q4H PRN, Rosa Monteiro MD    oxytocin 30 units in 500 mL lactated ringers infusion (non-titrating), 334 laney-units/min, Intravenous, Once PRN, Rosa Monteiro MD    oxytocin injection 10 Units, 10 Units, Intramuscular, Once PRN, Rosa Monteiro MD    prenatal vitamin oral tablet, 1 tablet, Oral, Daily, Rosa Monteiro MD    promethazine tablet 25 mg, 25 mg, Oral, Q6H PRN, Rosa Monteiro MD    senna-docusate 8.6-50 mg per tablet 1 tablet, 1 tablet, Oral, Nightly PRN, Rosa Monteiro MD    simethicone chewable tablet 80 mg, 1 tablet, Oral, Q6H PRN, Rosa Monteiro MD    tranexamic acid  (CYKLOKAPRON) 1,000 mg in sodium chloride 0.9 % 100 mL IVPB (MB+), 1,000 mg, Intravenous, Once PRN, Rosa Monteiro MD         VTE Risk Mitigation (From admission, onward)      None            Assessment:   Abnormal EKG/Frequent PVC's  Intrauterine Pregnancy    - s/p  x 1 (23)   Hypothyroidism   - Reports that she hasn't taken her medications over the last few days.   ADD  Scoliosis     Plan:   Obtain Echo.  Labs reviewed. T4 is low.  Pt is currently asymptomatic. Hesitant to start BB at this time but can consider in the future if she becomes symptomatic.  Will need f/u with Dr. King at discharge.    No further recommendations from cardiac standpoint. We will sign off at this time. Please don't hesitate to reach out with questions or concerns.     Thank you for your consult.     TERRI Macias  Cardiology  [facility] - 2nd Floor Mother/Baby Unit  2023 9:46 AM

## 2023-09-14 NOTE — LACTATION NOTE
"Encouraged frequent feeds on cue, discussed early hunger cues. Encouraged waking baby if needed to ensure 8 or more feeds per 24 hrs. Tips on waking sleepy baby discussed. Signs of milk transfer/adequate intake discussed. Encouraged to call with any signs indicating a problem, such as painful latch, nipple irritation, unable to sustain latch, or with any questions or needs.      "Cluster feeding" is normal; baby may nurse very often for several times in a row. This commonly occurs in the evening or early part of the night.   "

## 2023-09-14 NOTE — ANESTHESIA POSTPROCEDURE EVALUATION
Anesthesia Post Evaluation    Patient: Flex Emmanuel    Procedure(s) Performed: Procedure(s) (LRB):   SECTION (N/A)    Final Anesthesia Type: epidural      Patient location during evaluation: labor & delivery  Patient participation: Yes- Able to Participate  Level of consciousness: awake and alert  Post-procedure vital signs: reviewed and stable  Pain management: adequate  Airway patency: patent  ALINE mitigation strategies: Multimodal analgesia  PONV status at discharge: No PONV  Anesthetic complications: no      Cardiovascular status: blood pressure returned to baseline and hemodynamically stable  Respiratory status: unassisted  Hydration status: euvolemic  Follow-up not needed.          Vitals Value Taken Time   /72 23   Temp 37.1 °C (98.8 °F) 23 1942   Pulse 90 23   Resp 20 23 1930   SpO2 98 % 23 1915         No case tracking events are documented in the log.      Pain/Rosie Score: Pain Rating Prior to Med Admin: 5 (2023  7:30 PM)  Rosie Score: 10 (2023  7:00 PM)

## 2023-09-14 NOTE — PLAN OF CARE
Problem: Breastfeeding  Goal: Effective Breastfeeding  Outcome: Ongoing, Not Progressing  Intervention: Promote Breast Care and Comfort  Flowsheets (Taken 9/14/2023 1200)  Breast Care: Breastfeeding:   Hydrogel dressing applied   nipple shield utilized  Breast Pumping: hand expression utilized  Intervention: Promote Effective Breastfeeding  Flowsheets (Taken 9/14/2023 1200)  Breastfeeding Assistance:   assisted with positioning   assisted with techniques for flat/inverted nipples   feeding cue recognition promoted   feeding on demand promoted   feeding session observed   infant stimulated to wakeful state   nipple shield utilized   support offered  Parent/Child Attachment Promotion:   face-to-face positioning promoted   positive reinforcement provided   skin-to-skin contact encouraged  Intervention: Support Exclusive Breastfeeding Success  Flowsheets (Taken 9/14/2023 1200)  Supportive Measures:   decision-making supported   goal-setting facilitated  Breastfeeding Support:   diary/feeding log utilized   encouragement provided   infant-mother separation minimized   lactation counseling provided   maternal hydration promoted   maternal nutrition promoted   maternal rest encouraged

## 2023-09-15 PROCEDURE — 25000003 PHARM REV CODE 250: Performed by: OBSTETRICS & GYNECOLOGY

## 2023-09-15 PROCEDURE — 11000001 HC ACUTE MED/SURG PRIVATE ROOM

## 2023-09-15 RX ADMIN — PRENATAL VITAMINS-IRON FUMARATE 27 MG IRON-FOLIC ACID 0.8 MG TABLET 1 TABLET: at 09:09

## 2023-09-15 RX ADMIN — DOCUSATE SODIUM 200 MG: 100 CAPSULE, LIQUID FILLED ORAL at 08:09

## 2023-09-15 RX ADMIN — METFORMIN HYDROCHLORIDE 750 MG: 500 TABLET, FILM COATED ORAL at 09:09

## 2023-09-15 RX ADMIN — IBUPROFEN 800 MG: 600 TABLET ORAL at 06:09

## 2023-09-15 RX ADMIN — IBUPROFEN 800 MG: 600 TABLET ORAL at 04:09

## 2023-09-15 RX ADMIN — METFORMIN HYDROCHLORIDE 750 MG: 500 TABLET, FILM COATED ORAL at 08:09

## 2023-09-15 RX ADMIN — OXYCODONE AND ACETAMINOPHEN 1 TABLET: 10; 325 TABLET ORAL at 01:09

## 2023-09-15 RX ADMIN — LEVOTHYROXINE SODIUM 112 MCG: 112 TABLET ORAL at 06:09

## 2023-09-15 RX ADMIN — DOCUSATE SODIUM 200 MG: 100 CAPSULE, LIQUID FILLED ORAL at 09:09

## 2023-09-15 NOTE — PLAN OF CARE
Problem: Adult Inpatient Plan of Care  Goal: Plan of Care Review  Outcome: Ongoing, Progressing  Goal: Patient-Specific Goal (Individualized)  Outcome: Ongoing, Progressing  Goal: Absence of Hospital-Acquired Illness or Injury  Outcome: Ongoing, Progressing  Goal: Optimal Comfort and Wellbeing  Outcome: Ongoing, Progressing  Goal: Readiness for Transition of Care  Outcome: Ongoing, Progressing     Problem: Infection  Goal: Absence of Infection Signs and Symptoms  Outcome: Ongoing, Progressing     Problem:  Fall Injury Risk  Goal: Absence of Fall, Infant Drop and Related Injury  Outcome: Ongoing, Progressing     Problem: Adjustment to Role Transition (Postpartum  Delivery)  Goal: Successful Maternal Role Transition  Outcome: Ongoing, Progressing     Problem: Bleeding (Postpartum  Delivery)  Goal: Hemostasis  Outcome: Ongoing, Progressing     Problem: Infection (Postpartum  Delivery)  Goal: Absence of Infection Signs and Symptoms  Outcome: Ongoing, Progressing     Problem: Pain (Postpartum  Delivery)  Goal: Acceptable Pain Control  Outcome: Ongoing, Progressing     Problem: Postoperative Nausea and Vomiting (Postpartum  Delivery)  Goal: Nausea and Vomiting Relief  Outcome: Ongoing, Progressing     Problem: Postoperative Urinary Retention (Postpartum  Delivery)  Goal: Effective Urinary Elimination  Outcome: Ongoing, Progressing     Problem: Breastfeeding  Goal: Effective Breastfeeding  Outcome: Ongoing, Progressing

## 2023-09-15 NOTE — PLAN OF CARE
Problem: Breastfeeding  Goal: Effective Breastfeeding  Outcome: Ongoing, Progressing  Intervention: Promote Breast Care and Comfort  Flowsheets (Taken 9/15/2023 1315)  Breast Care: Breastfeeding: open to air  Breast Pumping: hand expression utilized  Intervention: Promote Effective Breastfeeding  Flowsheets (Taken 9/15/2023 1315)  Breastfeeding Assistance:   assisted with positioning   feeding on demand promoted   feeding session observed   support offered   nipple shield utilized   feeding cue recognition promoted   hand expression verified  Parent/Child Attachment Promotion:   cue recognition promoted   interaction encouraged   positive reinforcement provided   skin-to-skin contact encouraged  Intervention: Support Exclusive Breastfeeding Success  Flowsheets (Taken 9/15/2023 1315)  Supportive Measures:   active listening utilized   counseling provided   decision-making supported   self-care encouraged  Breastfeeding Support:   encouragement provided   lactation counseling provided

## 2023-09-15 NOTE — PROGRESS NOTES
PostPartum Progress Note        Subjective:      Post-Operative Day #2 after  delivery secondary to non reassuring FHT .    Patient is without complaints. Lochia less than menses. Breast feeding. Pain is well controlled. Patient is ambulating. Tolerating Full Regular diet.Overall mother and baby are doing well.     Objective:      Temp:  [97.9 °F (36.6 °C)-98 °F (36.7 °C)] 98 °F (36.7 °C)  Pulse:  [40-94] 40  Resp:  [16-18] 18  BP: (105-146)/(65-74) 105/65  No intake or output data in the 24 hours ending 09/15/23 0830  Body mass index is 37.3 kg/m².    General: no acute distress  Abdomen: soft, non-tender, non-distended; Fundus firm and at the umbilicus  Incision- intact, healing well, no sign of infection  Extremities: non-tender, symmetric, trace edema    Group & Rh   Date Value Ref Range Status   2023 A POS  Final   2023 A POS  Final     Recent Results (from the past 336 hour(s))   CBC with Differential    Collection Time: 23  6:06 AM   Result Value Ref Range    WBC 11.77 (H) 4.50 - 11.50 x10(3)/mcL    Hgb 9.7 (L) 12.0 - 16.0 g/dL    Hct 30.0 (L) 37.0 - 47.0 %    Platelet 214 130 - 400 x10(3)/mcL   CBC with Differential    Collection Time: 23  4:36 AM   Result Value Ref Range    WBC 8.56 4.50 - 11.50 x10(3)/mcL    Hgb 12.3 12.0 - 16.0 g/dL    Hct 38.7 37.0 - 47.0 %    Platelet 288 130 - 400 x10(3)/mcL          Assessment:     28 y.o.  S/P  Delivery Post-Operative Day #2  - Doing Well      Plan:     1. Continue routine postpartum care  2. Plan for D/C in AM  3. Will need to f/u with Cardiology within 2-4 weeks after discharge.

## 2023-09-16 VITALS
BODY MASS INDEX: 37.3 KG/M2 | HEIGHT: 60 IN | RESPIRATION RATE: 20 BRPM | DIASTOLIC BLOOD PRESSURE: 83 MMHG | TEMPERATURE: 98 F | WEIGHT: 190 LBS | OXYGEN SATURATION: 98 % | HEART RATE: 71 BPM | SYSTOLIC BLOOD PRESSURE: 123 MMHG

## 2023-09-16 LAB
ANION GAP SERPL CALC-SCNC: 10 MEQ/L
BUN SERPL-MCNC: 6.5 MG/DL (ref 7–18.7)
CALCIUM SERPL-MCNC: 8.9 MG/DL (ref 8.4–10.2)
CHLORIDE SERPL-SCNC: 105 MMOL/L (ref 98–107)
CO2 SERPL-SCNC: 22 MMOL/L (ref 22–29)
CREAT SERPL-MCNC: 0.68 MG/DL (ref 0.55–1.02)
CREAT/UREA NIT SERPL: 10
GFR SERPLBLD CREATININE-BSD FMLA CKD-EPI: >60 MLS/MIN/1.73/M2
GLUCOSE SERPL-MCNC: 76 MG/DL (ref 74–100)
MAGNESIUM SERPL-MCNC: 1.6 MG/DL (ref 1.6–2.6)
POTASSIUM SERPL-SCNC: 4.3 MMOL/L (ref 3.5–5.1)
SODIUM SERPL-SCNC: 137 MMOL/L (ref 136–145)

## 2023-09-16 PROCEDURE — 93005 ELECTROCARDIOGRAM TRACING: CPT

## 2023-09-16 PROCEDURE — 25000003 PHARM REV CODE 250: Performed by: NURSE PRACTITIONER

## 2023-09-16 PROCEDURE — 83735 ASSAY OF MAGNESIUM: CPT | Performed by: NURSE PRACTITIONER

## 2023-09-16 PROCEDURE — 25000003 PHARM REV CODE 250: Performed by: OBSTETRICS & GYNECOLOGY

## 2023-09-16 PROCEDURE — 80048 BASIC METABOLIC PNL TOTAL CA: CPT | Performed by: NURSE PRACTITIONER

## 2023-09-16 RX ORDER — MAGNESIUM SULFATE HEPTAHYDRATE 40 MG/ML
4 INJECTION, SOLUTION INTRAVENOUS ONCE
Status: DISCONTINUED | OUTPATIENT
Start: 2023-09-16 | End: 2023-09-16

## 2023-09-16 RX ORDER — LANOLIN ALCOHOL/MO/W.PET/CERES
800 CREAM (GRAM) TOPICAL ONCE
Status: COMPLETED | OUTPATIENT
Start: 2023-09-16 | End: 2023-09-16

## 2023-09-16 RX ORDER — OXYCODONE AND ACETAMINOPHEN 5; 325 MG/1; MG/1
1 TABLET ORAL EVERY 4 HOURS PRN
Qty: 15 TABLET | Refills: 0 | Status: SHIPPED | OUTPATIENT
Start: 2023-09-16 | End: 2024-03-06 | Stop reason: ALTCHOICE

## 2023-09-16 RX ORDER — LANOLIN ALCOHOL/MO/W.PET/CERES
400 CREAM (GRAM) TOPICAL ONCE
Status: CANCELLED | OUTPATIENT
Start: 2023-09-16 | End: 2023-09-16

## 2023-09-16 RX ADMIN — LEVOTHYROXINE SODIUM 112 MCG: 112 TABLET ORAL at 06:09

## 2023-09-16 RX ADMIN — IBUPROFEN 800 MG: 600 TABLET ORAL at 11:09

## 2023-09-16 RX ADMIN — IBUPROFEN 800 MG: 600 TABLET ORAL at 03:09

## 2023-09-16 RX ADMIN — OXYCODONE HYDROCHLORIDE AND ACETAMINOPHEN 1 TABLET: 5; 325 TABLET ORAL at 09:09

## 2023-09-16 RX ADMIN — PRENATAL VITAMINS-IRON FUMARATE 27 MG IRON-FOLIC ACID 0.8 MG TABLET 1 TABLET: at 09:09

## 2023-09-16 RX ADMIN — METFORMIN HYDROCHLORIDE 750 MG: 500 TABLET, FILM COATED ORAL at 09:09

## 2023-09-16 RX ADMIN — DOCUSATE SODIUM 200 MG: 100 CAPSULE, LIQUID FILLED ORAL at 09:09

## 2023-09-16 RX ADMIN — Medication 800 MG: at 02:09

## 2023-09-16 RX ADMIN — OXYCODONE AND ACETAMINOPHEN 1 TABLET: 10; 325 TABLET ORAL at 12:09

## 2023-09-16 NOTE — PLAN OF CARE
Problem: Breastfeeding  Goal: Effective Breastfeeding  Outcome: Unable to Meet, Plan Revised  Intervention: Promote Breast Care and Comfort  Flowsheets (Taken 9/16/2023 0830)  Breast Care: Breastfeeding: open to air  Breast Pumping: double electric breast pump utilized  Intervention: Promote Effective Breastfeeding  Flowsheets (Taken 9/16/2023 0830)  Breastfeeding Assistance:   alternative feeding device utilized   electric breast pump used   feeding on demand promoted   feeding cue recognition promoted   support offered  Parent/Child Attachment Promotion:   cue recognition promoted   skin-to-skin contact encouraged   Triple feeding as of last night. Will offer baby to breastfeed every 2-3 hours, supplement with formula, and pump after feeds. Encouraged mother to call for help when needed.

## 2023-09-16 NOTE — PROGRESS NOTES
[facility] - 2nd Floor Mother/Baby Unit    Cardiology  Progress Note    Patient Name: Flex Emmanuel  MRN: [id number]  Admission Date: 2023  Hospital Length of Stay: 3 days  Code Status: Prior   Attending Provider: Rosa Monteiro MD   Consulting Provider: TERRI Reese  Primary Care Physician: Damion Rae MD  Principal Problem: delivery delivered    Patient information was obtained from patient, past medical records, and ER records.     Subjective:     Reason for Consult: Abnormal EKG     HPI:   Ms. Emmanuel is a 28 year old female who is unknown to [facility]. She presents to [facility] on 23 and underwent a successful  s/t non reassuring FHT. She denies CP, SOB, or palps but endorses surgical incision pain. An EKG was obtained and demonstrated NSR w/ frequent PVC's. She reports that she has not been taking her thyroid medication as prescribed. [facility] has been consulted to further evaluate her abnormal EKG & frequent PVC's.       Hospital Course:  23; Reconsulted due to frequent PVCs and bradycardia at times. Echo unremarkable.      PMH: ADD, Alopecia, Scoliosis, Thyroid Disease  PSH:    Family History: Mother - CA; Father - heart disease  Social History: Denies alcohol, tobacco, or illicit drug use.     Previous Cardiac Diagnostics:   TTE 23:   Left Ventricle: The left ventricle is normal in size. Normal wall thickness. Normal wall motion. There is low normal systolic function. Ejection fraction by visual approximation is 50%. There is normal diastolic function.    Right Ventricle: Normal right ventricular cavity size. Systolic function is normal.    Mitral Valve: There is mild regurgitation.    Tricuspid Valve: There is trace regurgitation.    Pulmonic Valve: There is trace regurgitation.     No current facility-administered medications on file prior to encounter.     Current Outpatient Medications on File Prior to Encounter   Medication  Sig    levothyroxine (SYNTHROID) 112 MCG tablet Take 112 mcg by mouth once daily.    metFORMIN (GLUCOPHAGE-XR) 750 MG ER 24hr tablet Take 750 mg by mouth 2 (two) times daily.     Review of Systems   Respiratory:  Negative for shortness of breath.    Cardiovascular:  Negative for chest pain and palpitations.   Gastrointestinal:         +Surgical Incision Pain        Objective:     Vital Signs (Most Recent):  Temp: 97.8 °F (36.6 °C) (09/16/23 0819)  Pulse: 71 (09/16/23 0819)  Resp: 18 (09/16/23 0819)  BP: 123/83 (09/16/23 0819)  SpO2: 98 % (09/13/23 1915) Vital Signs (24h Range):  Temp:  [97.8 °F (36.6 °C)-98.8 °F (37.1 °C)] 97.8 °F (36.6 °C)  Pulse:  [41-71] 71  Resp:  [18-19] 18  BP: (103-123)/(56-83) 123/83     Weight: 86.2 kg (190 lb)  Body mass index is 37.3 kg/m².    SpO2: 98 %       No intake or output data in the 24 hours ending 09/16/23 0908      Lines/Drains/Airways       Drain  Duration                  Urethral Catheter 09/13/23 1055 Non-latex 16 Fr. 2 days              Peripheral Intravenous Line  Duration                  Peripheral IV - Single Lumen 09/13/23 0430 18 G Anterior;Left;Proximal Forearm 3 days                    Significant Labs:  Recent Results (from the past 72 hour(s))   CBC with Differential    Collection Time: 09/14/23  6:06 AM   Result Value Ref Range    WBC 11.77 (H) 4.50 - 11.50 x10(3)/mcL    RBC 3.62 (L) 4.20 - 5.40 x10(6)/mcL    Hgb 9.7 (L) 12.0 - 16.0 g/dL    Hct 30.0 (L) 37.0 - 47.0 %    MCV 82.9 80.0 - 94.0 fL    MCH 26.8 (L) 27.0 - 31.0 pg    MCHC 32.3 (L) 33.0 - 36.0 g/dL    RDW 15.7 11.5 - 17.0 %    Platelet 214 130 - 400 x10(3)/mcL    MPV 11.3 (H) 7.4 - 10.4 fL    Neut % 81.9 %    Lymph % 11.3 %    Mono % 5.9 %    Eos % 0.3 %    Basophil % 0.3 %    Lymph # 1.33 0.6 - 4.6 x10(3)/mcL    Neut # 9.63 (H) 2.1 - 9.2 x10(3)/mcL    Mono # 0.70 0.1 - 1.3 x10(3)/mcL    Eos # 0.03 0 - 0.9 x10(3)/mcL    Baso # 0.04 <=0.2 x10(3)/mcL    IG# 0.04 0 - 0.04 x10(3)/mcL    IG% 0.3 %    NRBC%  0.0 %   Echo    Collection Time: 09/14/23 10:11 AM   Result Value Ref Range    BSA 1.91 m2    Monique's Biplane MOD Ejection Fraction 59 %    LVOT stroke volume 69.39 cm3    LVIDd 5.01 3.5 - 6.0 cm    LV Systolic Volume 56.60 mL    LV Systolic Volume Index 31.1 mL/m2    LVIDs 3.66 2.1 - 4.0 cm    LV Diastolic Volume 119.00 mL    LV Diastolic Volume Index 65.38 mL/m2    IVS 0.91 0.6 - 1.1 cm    LVOT diameter 2.00 cm    LVOT area 3.1 cm2    FS 27 28 - 44 %    Left Ventricle Relative Wall Thickness 0.37 cm    Posterior Wall 0.92 0.6 - 1.1 cm    LV mass 162.23 g    LV Mass Index 89 g/m2    MV Peak E Mukund 1.13 m/s    TDI LATERAL 0.23 m/s    TDI SEPTAL 0.13 m/s    E/E' ratio 6.28 m/s    MV Peak A Mukund 0.61 m/s    TR Max Mkuund 2.13 m/s    E/A ratio 1.85     E wave deceleration time 127.00 msec    LV SEPTAL E/E' RATIO 8.69 m/s    LV LATERAL E/E' RATIO 4.91 m/s    LVOT peak mukund 1.21 m/s    Left Ventricular Outflow Tract Mean Velocity 0.82 cm/s    Left Ventricular Outflow Tract Mean Gradient 3.00 mmHg    LA volume (mod) 62.30 cm3    LA Volume Index (Mod) 34.2 mL/m2    LA size 3.30 cm    RVDD 2.41 cm    TAPSE 2.09 cm    RA Major Axis 4.47 cm    RA Width 3.25 cm    AV mean gradient 6 mmHg    AV peak gradient 10 mmHg    Ao peak mukund 1.59 m/s    Ao VTI 28.00 cm    LVOT peak VTI 22.10 cm    AV valve area 2.48 cm²    AV Velocity Ratio 0.76     AV index (prosthetic) 0.79     VINAY by Velocity Ratio 2.39 cm²    MV mean gradient 2 mmHg    MV peak gradient 3 mmHg    MV valve area by continuity eq 3.32 cm2    MV VTI 20.9 cm    Triscuspid Valve Regurgitation Peak Gradient 18 mmHg    Mean e' 0.18 m/s    ZLVIDS 1.28     ZLVIDD -0.06     EF 50 %   T3, Free (OLG)    Collection Time: 09/14/23 10:14 AM   Result Value Ref Range    T3 Free 2.30 1.58 - 3.91 pg/mL   Comprehensive Metabolic Panel    Collection Time: 09/14/23 10:14 AM   Result Value Ref Range    Sodium Level 138 136 - 145 mmol/L    Potassium Level 4.3 3.5 - 5.1 mmol/L    Chloride 109 (H)  98 - 107 mmol/L    Carbon Dioxide 23 22 - 29 mmol/L    Glucose Level 137 (H) 74 - 100 mg/dL    Blood Urea Nitrogen 4.6 (L) 7.0 - 18.7 mg/dL    Creatinine 0.67 0.55 - 1.02 mg/dL    Calcium Level Total 8.5 8.4 - 10.2 mg/dL    Protein Total 5.0 (L) 6.4 - 8.3 gm/dL    Albumin Level 2.2 (L) 3.5 - 5.0 g/dL    Globulin 2.8 2.4 - 3.5 gm/dL    Albumin/Globulin Ratio 0.8 (L) 1.1 - 2.0 ratio    Bilirubin Total 0.3 <=1.5 mg/dL    Alkaline Phosphatase 119 40 - 150 unit/L    Alanine Aminotransferase 11 0 - 55 unit/L    Aspartate Aminotransferase 22 5 - 34 unit/L    eGFR >60 mls/min/1.73/m2   Magnesium    Collection Time: 09/14/23 10:14 AM   Result Value Ref Range    Magnesium Level 1.60 1.60 - 2.60 mg/dL       Significant Imaging:      EKG:        Telemetry:  Not on tele    Physical Exam  HENT:      Head: Normocephalic.      Nose: Nose normal.      Mouth/Throat:      Mouth: Mucous membranes are dry.   Eyes:      Extraocular Movements: Extraocular movements intact.   Cardiovascular:      Rate and Rhythm: Normal rate and regular rhythm.      Pulses: Normal pulses.      Heart sounds: Normal heart sounds.      Comments: Bigeminal PVCs  Pulmonary:      Effort: Pulmonary effort is normal.      Breath sounds: Normal breath sounds.   Abdominal:      Palpations: Abdomen is soft.   Musculoskeletal:      Right lower leg: Edema present.      Left lower leg: Edema present.   Skin:     General: Skin is warm and dry.      Comments: + Abdominal Transverse Surgical Incision    Neurological:      Mental Status: She is alert and oriented to person, place, and time.   Psychiatric:         Behavior: Behavior normal.         Home Medications:   No current facility-administered medications on file prior to encounter.     Current Outpatient Medications on File Prior to Encounter   Medication Sig Dispense Refill    levothyroxine (SYNTHROID) 112 MCG tablet Take 112 mcg by mouth once daily.      metFORMIN (GLUCOPHAGE-XR) 750 MG ER 24hr tablet Take 750 mg  by mouth 2 (two) times daily.         Current Inpatient Medications:    Current Facility-Administered Medications:     bisacodyL suppository 10 mg, 10 mg, Rectal, Once PRN, Rosa Monteiro MD    carboprost injection 250 mcg, 250 mcg, Intramuscular, Q15 Min PRN, Rosa Monteiro MD    diphenhydrAMINE capsule 25 mg, 25 mg, Oral, Q4H PRN, Rosa Monteiro MD    docusate sodium capsule 200 mg, 200 mg, Oral, BID, Rosa Monteiro MD, 200 mg at 09/15/23 2028    HYDROmorphone (PF) injection 1 mg, 1 mg, Intravenous, Q4H PRN, Rosa Monteiro MD, 1 mg at 09/13/23 1930    [COMPLETED] ketorolac injection 30 mg, 30 mg, Intravenous, Q8H, 30 mg at 09/14/23 1401 **FOLLOWED BY** ibuprofen tablet 800 mg, 800 mg, Oral, Q8H, Roas Monteiro MD, 800 mg at 09/16/23 0340    lanolin cream, , Topical (Top), PRN, Rosa Monteiro MD    levothyroxine tablet 112 mcg, 112 mcg, Oral, Before breakfast, Rosa Monteiro MD, 112 mcg at 09/16/23 0657    magnesium hydroxide 400 mg/5 ml suspension 2,400 mg, 30 mL, Oral, BID PRN, Rosa Monteiro MD    metformin split tablet 750 mg, 750 mg, Oral, BID WM, Rosa Monteiro MD, 750 mg at 09/15/23 2028    methylergonovine injection 200 mcg, 200 mcg, Intramuscular, Q2H PRN, Rosa Monteiro MD    miSOPROStoL tablet 800 mcg, 800 mcg, Rectal, Once PRN, Rosa Monteiro MD    miSOPROStoL tablet 800 mcg, 800 mcg, Oral, Once PRN, Rosa Monteiro MD    ondansetron disintegrating tablet 8 mg, 8 mg, Oral, Q8H PRN, Rosa Monteiro MD    oxyCODONE-acetaminophen  mg per tablet 1 tablet, 1 tablet, Oral, Q4H PRN, Rosa Monteiro MD, 1 tablet at 09/16/23 0039    oxyCODONE-acetaminophen 5-325 mg per tablet 1 tablet, 1 tablet, Oral, Q4H PRN, Rosa Monteiro MD, 1 tablet at 09/14/23 2000    oxytocin 30 units in 500 mL lactated ringers infusion (non-titrating), 334 laney-units/min, Intravenous, Once PRN, Rosa Monteiro MD    oxytocin injection 10 Units, 10 Units,  Intramuscular, Once PRN, Rosa Monteiro MD    prenatal vitamin oral tablet, 1 tablet, Oral, Daily, Rosa Monteiro MD, 1 tablet at 09/15/23 0929    promethazine tablet 25 mg, 25 mg, Oral, Q6H PRN, Rosa Monteiro MD    senna-docusate 8.6-50 mg per tablet 1 tablet, 1 tablet, Oral, Nightly PRN, Rosa Monteiro MD    simethicone chewable tablet 80 mg, 1 tablet, Oral, Q6H PRN, Rosa Monteiro MD    tranexamic acid (CYKLOKAPRON) 1,000 mg in sodium chloride 0.9 % 100 mL IVPB (MB+), 1,000 mg, Intravenous, Once PRN, Rosa Monteiro MD         VTE Risk Mitigation (From admission, onward)      None          I,Tono Chow MD,performed the substantive portion of this visit. I had a face-to-face time with the patient on 23. I reviewed and agree with the nurse practitioner's history, physical exam.     Medical decision making:       Assessment/Plan:   Abnormal EKG/Frequent PVC's - asymptomatic   - normal LV function  Intrauterine Pregnancy    - s/p  x 1 (23)   Hypothyroidism   - Reports that she hasn't taken her medications over the last few days.   ADD  Scoliosis     Obtain Mg level. Keep > 2.0  Will plan 48 hour holter monitor w/ Dr. Chow in 2 weeks  Will need f/u with Dr. King at discharge.  F/u w/ Dr. Chow in 2-3 weeks    No further recommendations from cardiac standpoint. We will sign off at this time. Please don't hesitate to reach out with questions or concerns.     Thank you for your consult.     Isabel Kelly, TESHA  Cardiology  [facility] - 2nd Floor Mother/Baby Unit  2023 9:46 AM

## 2023-09-16 NOTE — DISCHARGE SUMMARY
Delivery Discharge Summary  Obstetrics      Primary OB Clinician: Rosa Monteiro MD      Admission date: 2023  Discharge date: 2023    Disposition: To home, self care, short interval F/U with Cardiology    Discharge Diagnosis List:      Patient Active Problem List   Diagnosis    Infertility, female    Hypothyroid    PCOS (polycystic ovarian syndrome)    Pregnancy    Non-reassuring fetal heart rate with late deceleration     delivery delivered       Procedure: , due to NRFHR    Hospital Course:  Flex Emmanuel is a 28 y.o. now , POD #3 who was admitted on 2023 . Patient was subsequently admitted to labor and delivery unit with signed consents.         Labor course was complicated by NRFHR, and decision was made to proceed with delivery via  which was performed without complications.    Please see delivery note for further details. Her postpartum course was complicated abnormal EKG followed by cardiology re-evaluated today and cleared for discharge. On discharge day, patient's pain is controlled with oral pain medications. Pt is tolerating ambulation without SOB or CP, and regular diet without N/V. Reports lochia is mild. Denies any HA, vision changes, F/C, LE swelling. Denies any breast pain/soreness.    Pt in stable condition and ready for discharge. She has been instructed to start and/or continue medications and follow up with her obstetrics provider as listed below.    Pertinent studies:  CBC  Recent Labs   Lab 23  0436 23  0606   WBC 8.56 11.77*   HGB 12.3 9.7*   HCT 38.7 30.0*   MCV 83.8 82.9    214        Exam:  Fundus firm at umbilicus  Incision clean dry and intact  Bilateral lower extremity no edema or tenderness      Immunization History   Administered Date(s) Administered    COVID-19, MRNA, LN-S, PF (Pfizer) (Purple Cap) 01/15/2021, 2021    DTP 1993, 1993, 1994, 10/13/1994, 1998    HIB 1993,  "1993, 1994    Hepatitis B 1993, 1993, 1994    Hepatitis B, Pediatric/Adolescent 1993, 1993, 1994    HiB PRP-OMP 10/13/1994    Influenza - Quadrivalent - PF *Preferred* (6 months and older) 2016, 2017, 10/06/2022    Influenza - Trivalent (ADULT) 10/08/2009, 10/31/2011, 10/19/2013, 10/09/2014    Influenza - Trivalent - PF (ADULT) 10/08/2009, 10/31/2011, 10/19/2013, 10/09/2014, 2015, 10/01/2020, 2021    MMR 10/13/1994, 1998    Meningococcal Conjugate (MCV4P) 03/10/2006, 2011    OPV 1993, 1993, 10/13/1994, 1998    PPD Test 2016, 2016, 2016, 2018    Tdap 03/10/2006, 10/03/2015, 10/13/2015        Delivery:    Episiotomy:     Lacerations:     Repair suture:     Repair # of packets:     Blood loss (ml):       Birth information:  YOB: 2025   Time of birth: 6:02 PM   Sex: male   Delivery type: , Low Transverse   Gestational Age: 39w4d     Measurements    Weight: 4290 g  Weight (lbs): 9 lb 7.3 oz  Length: 52.1 cm  Length (in): 20.5"  Head circumference: 36.8 cm         Delivery Clinician: Delivery Providers    Delivering clinician: Rosa Monteiro MD   Provider Role    Neha Peraza RN Registered Nurse    Yoly Mckenzie RN Registered Nurse    Vicki Bunch RN Registered Nurse    Mary Mcknight RN Registered Nurse    Myron Agarwal MD Anesthesiologist    Demarco Mobley, CRNA Nurse Anesthetist    Stacy Alvarado ST Scrub Person    Gely Grimm Scrub Person    Fei Lopes, RRT Respiratory Therapist             Additional  information:  Forceps:    Vacuum:    Breech:    Observed anomalies      Living?:     Apgars    Living status: Living  Apgar Component Scores:  1 min.:  5 min.:  10 min.:  15 min.:  20 min.:    Skin color:  0  1       Heart rate:  1  2       Reflex irritability:  0  2       Muscle tone:  2  2       Respiratory effort:  0  2       Total:  3  9 "       Apgars assigned by: VINCE SEE RN         Placenta: Delivered:       appearance    Patient Instructions:   Current Discharge Medication List        CONTINUE these medications which have NOT CHANGED    Details   levothyroxine (SYNTHROID) 112 MCG tablet Take 112 mcg by mouth once daily.      metFORMIN (GLUCOPHAGE-XR) 750 MG ER 24hr tablet Take 750 mg by mouth 2 (two) times daily.             No discharge procedures on file.     Follow-up Information       Phyllis Dozier MD Follow up in 2 week(s).    Specialty: Obstetrics and Gynecology  Contact information:  [address] [place] [address] Drive  [address]  [place] [address]  [phone]               Julian King MD Follow up on 9/28/2023.    Specialty: Cardiology  Why: @ 1:00PM  Contact information:  [address]  [place] [address]  [phone]               Tono Chow MD Follow up.    Specialties: Cardiology, Pathology  Why: 2 weeks for holter monitor and then f/u cassandra  Contact information:  [address]  [place] [address]  [phone]                              Follow-[facility] will be at [facility].  Follow-up will be in approximately 1-2 weeks and Cardiology as noted above.  ER precautions were reviewed with the patient and she was given opportunity to ask questions.  Stable for discharge        This note was created with the assistance of [employer] voice recognition software. There may be transcription errors as a result of using this technology however minimal. Effort has been made to assure accuracy of transcription but any obvious errors or omissions should be clarified with the author of the document.

## 2023-09-16 NOTE — PLAN OF CARE
Problem: Adult Inpatient Plan of Care  Goal: Plan of Care Review  Outcome: Ongoing, Progressing  Goal: Patient-Specific Goal (Individualized)  Outcome: Ongoing, Progressing  Goal: Absence of Hospital-Acquired Illness or Injury  Outcome: Ongoing, Progressing  Goal: Optimal Comfort and Wellbeing  Outcome: Ongoing, Progressing  Goal: Readiness for Transition of Care  Outcome: Ongoing, Progressing     Problem: Adjustment to Role Transition (Postpartum  Delivery)  Goal: Successful Maternal Role Transition  Outcome: Ongoing, Progressing     Problem: Bleeding (Postpartum  Delivery)  Goal: Hemostasis  Outcome: Ongoing, Progressing     Problem: Postoperative Nausea and Vomiting (Postpartum  Delivery)  Goal: Nausea and Vomiting Relief  Outcome: Ongoing, Progressing     Problem: Breastfeeding  Goal: Effective Breastfeeding  Outcome: Ongoing, Progressing

## 2023-09-16 NOTE — NURSING
Written discharge instructions were given and reviewed, including meds, follow up appointments, and precautions to take at home. Pt verbalized understanding.

## 2023-09-16 NOTE — PROGRESS NOTES
POSTPARTUM PROGRESS NOTE    Subjective:     PPD/POD#: 3   Procedure: Primary LTCS   EGA: 39w4d   N/V: No       Abd Pain: Mild, well-controlled with oral pain medication   Lochia: Mild   Voiding: Yes   Ambulating: Yes   Bowel fnc: Yes             Objective:      Temp:  [97.8 °F (36.6 °C)-98.8 °F (37.1 °C)] 97.8 °F (36.6 °C)  Pulse:  [41-71] 71  Resp:  [18-20] 20  BP: (103-123)/(56-83) 123/83    Lung: Normal respiratory effort   Abdomen: Soft, appropriately tender   Uterus: Firm, no fundal tenderness   Incision: Clean, dry, and intact.  No erythema, induration, or drainage.   : Deferred   Extremities: Bilateral trace edema     Lab Review    Recent Labs   Lab 09/14/23  1014      K 4.3   CO2 23   BUN 4.6*   CREATININE 0.67   BILITOT 0.3   ALKPHOS 119   ALT 11   AST 22   MG 1.60       Recent Labs   Lab 09/13/23  0436 09/14/23  0606   WBC 8.56 11.77*   HGB 12.3 9.7*   HCT 38.7 30.0*   MCV 83.8 82.9    214         I/O  No intake or output data in the 24 hours ending 09/16/23 1010     Assessment and Plan:   Postpartum care:  - Patient doing well.  - New Onset Bradycardia over past 12-24 hr and repeated EKG therefore requested cardiology to reevaluate prior to discharge  - Asymptomatic           Royce Rodriguez        This note was created with the assistance of [employer] voice recognition software. There may be transcription errors as a result of using this technology however minimal. Effort has been made to assure accuracy of transcription but any obvious errors or omissions should be clarified with the author of the document.

## 2023-09-17 LAB
ABO + RH BLD: NORMAL
ABO + RH BLD: NORMAL
BLD PROD TYP BPU: NORMAL
BLD PROD TYP BPU: NORMAL
BLOOD UNIT EXPIRATION DATE: NORMAL
BLOOD UNIT EXPIRATION DATE: NORMAL
BLOOD UNIT TYPE CODE: 6200
BLOOD UNIT TYPE CODE: 6200
CROSSMATCH INTERPRETATION: NORMAL
CROSSMATCH INTERPRETATION: NORMAL
DISPENSE STATUS: NORMAL
DISPENSE STATUS: NORMAL
UNIT NUMBER: NORMAL
UNIT NUMBER: NORMAL

## 2023-09-18 ENCOUNTER — PATIENT OUTREACH (OUTPATIENT)
Dept: ADMINISTRATIVE | Facility: CLINIC | Age: 28
End: 2023-09-18
Payer: COMMERCIAL

## 2023-11-16 ENCOUNTER — TELEPHONE (OUTPATIENT)
Dept: INTERNAL MEDICINE | Facility: CLINIC | Age: 28
End: 2023-11-16
Payer: COMMERCIAL

## 2023-11-16 DIAGNOSIS — E55.9 VITAMIN D DEFICIENCY: ICD-10-CM

## 2023-11-16 DIAGNOSIS — Z00.00 WELLNESS EXAMINATION: Primary | ICD-10-CM

## 2023-11-16 DIAGNOSIS — Z13.29 SCREENING FOR HYPOTHYROIDISM: ICD-10-CM

## 2023-11-16 NOTE — TELEPHONE ENCOUNTER
----- Message from Francisco Benjamin MA sent at 11/16/2023  8:24 AM CST -----  Regarding: ALY 11/30/23 @ 3:20 Dr. Noel  1. Are there any outstanding tasks in the patient's chart? Yes, fasting labs    2. Is there any documentation in the chart? No    3.Has patient been seen in an ER, Urgent care clinic, or been admitted since last visit?  If yes, When, where, and why    4. Has patient seen any other healthcare providers since last visit?  If yes, when, where, and why    5. Has patient had any bloodwork or XR done since last visit?    6. Is patient signed up for patient portal?

## 2023-11-27 ENCOUNTER — LAB VISIT (OUTPATIENT)
Dept: LAB | Facility: HOSPITAL | Age: 28
End: 2023-11-27
Attending: INTERNAL MEDICINE
Payer: COMMERCIAL

## 2023-11-27 DIAGNOSIS — Z00.00 WELLNESS EXAMINATION: ICD-10-CM

## 2023-11-27 DIAGNOSIS — E55.9 VITAMIN D DEFICIENCY: ICD-10-CM

## 2023-11-27 DIAGNOSIS — Z13.29 SCREENING FOR HYPOTHYROIDISM: ICD-10-CM

## 2023-11-27 LAB
ALBUMIN SERPL-MCNC: 4.4 G/DL (ref 3.5–5)
ALBUMIN/GLOB SERPL: 1.4 RATIO (ref 1.1–2)
ALP SERPL-CCNC: 178 UNIT/L (ref 40–150)
ALT SERPL-CCNC: 172 UNIT/L (ref 0–55)
APPEARANCE UR: CLEAR
AST SERPL-CCNC: 61 UNIT/L (ref 5–34)
BACTERIA #/AREA URNS AUTO: ABNORMAL /HPF
BASOPHILS # BLD AUTO: 0.03 X10(3)/MCL
BASOPHILS NFR BLD AUTO: 0.7 %
BILIRUB SERPL-MCNC: 0.3 MG/DL
BILIRUB UR QL STRIP.AUTO: NEGATIVE
BUN SERPL-MCNC: 11.1 MG/DL (ref 7–18.7)
CALCIUM SERPL-MCNC: 9.9 MG/DL (ref 8.4–10.2)
CHLORIDE SERPL-SCNC: 107 MMOL/L (ref 98–107)
CHOLEST SERPL-MCNC: 295 MG/DL
CHOLEST/HDLC SERPL: 6 {RATIO} (ref 0–5)
CO2 SERPL-SCNC: 25 MMOL/L (ref 22–29)
COLOR UR AUTO: ABNORMAL
CREAT SERPL-MCNC: 0.87 MG/DL (ref 0.55–1.02)
DEPRECATED CALCIDIOL+CALCIFEROL SERPL-MC: 39.3 NG/ML (ref 30–80)
EOSINOPHIL # BLD AUTO: 0.09 X10(3)/MCL (ref 0–0.9)
EOSINOPHIL NFR BLD AUTO: 2 %
ERYTHROCYTE [DISTWIDTH] IN BLOOD BY AUTOMATED COUNT: 16 % (ref 11.5–17)
EST. AVERAGE GLUCOSE BLD GHB EST-MCNC: 96.8 MG/DL
GFR SERPLBLD CREATININE-BSD FMLA CKD-EPI: >60 MLS/MIN/1.73/M2
GLOBULIN SER-MCNC: 3.1 GM/DL (ref 2.4–3.5)
GLUCOSE SERPL-MCNC: 87 MG/DL (ref 74–100)
GLUCOSE UR QL STRIP.AUTO: NORMAL
HBA1C MFR BLD: 5 %
HCT VFR BLD AUTO: 42.8 % (ref 37–47)
HDLC SERPL-MCNC: 49 MG/DL (ref 35–60)
HGB BLD-MCNC: 14 G/DL (ref 12–16)
IMM GRANULOCYTES # BLD AUTO: 0.01 X10(3)/MCL (ref 0–0.04)
IMM GRANULOCYTES NFR BLD AUTO: 0.2 %
KETONES UR QL STRIP.AUTO: NEGATIVE
LDLC SERPL CALC-MCNC: 206 MG/DL (ref 50–140)
LEUKOCYTE ESTERASE UR QL STRIP.AUTO: NEGATIVE
LYMPHOCYTES # BLD AUTO: 1.5 X10(3)/MCL (ref 0.6–4.6)
LYMPHOCYTES NFR BLD AUTO: 33.3 %
MCH RBC QN AUTO: 28.1 PG (ref 27–31)
MCHC RBC AUTO-ENTMCNC: 32.7 G/DL (ref 33–36)
MCV RBC AUTO: 85.9 FL (ref 80–94)
MONOCYTES # BLD AUTO: 0.35 X10(3)/MCL (ref 0.1–1.3)
MONOCYTES NFR BLD AUTO: 7.8 %
MUCOUS THREADS URNS QL MICRO: ABNORMAL /LPF
NEUTROPHILS # BLD AUTO: 2.52 X10(3)/MCL (ref 2.1–9.2)
NEUTROPHILS NFR BLD AUTO: 56 %
NITRITE UR QL STRIP.AUTO: NEGATIVE
NRBC BLD AUTO-RTO: 0 %
PH UR STRIP.AUTO: 5 [PH]
PLATELET # BLD AUTO: 287 X10(3)/MCL (ref 130–400)
PMV BLD AUTO: 10.2 FL (ref 7.4–10.4)
POTASSIUM SERPL-SCNC: 4.4 MMOL/L (ref 3.5–5.1)
PROT SERPL-MCNC: 7.5 GM/DL (ref 6.4–8.3)
PROT UR QL STRIP.AUTO: NEGATIVE
RBC # BLD AUTO: 4.98 X10(6)/MCL (ref 4.2–5.4)
RBC #/AREA URNS AUTO: ABNORMAL /HPF
RBC UR QL AUTO: NEGATIVE
SODIUM SERPL-SCNC: 141 MMOL/L (ref 136–145)
SP GR UR STRIP.AUTO: 1.01 (ref 1–1.03)
SQUAMOUS #/AREA URNS LPF: ABNORMAL /HPF
TRIGL SERPL-MCNC: 198 MG/DL (ref 37–140)
TSH SERPL-ACNC: 0.55 UIU/ML (ref 0.35–4.94)
UROBILINOGEN UR STRIP-ACNC: NORMAL
VLDLC SERPL CALC-MCNC: 40 MG/DL
WBC # SPEC AUTO: 4.5 X10(3)/MCL (ref 4.5–11.5)
WBC #/AREA URNS AUTO: ABNORMAL /HPF

## 2023-11-27 PROCEDURE — 81001 URINALYSIS AUTO W/SCOPE: CPT

## 2023-11-27 PROCEDURE — 36415 COLL VENOUS BLD VENIPUNCTURE: CPT

## 2023-11-27 PROCEDURE — 80061 LIPID PANEL: CPT

## 2023-11-27 PROCEDURE — 83036 HEMOGLOBIN GLYCOSYLATED A1C: CPT

## 2023-11-27 PROCEDURE — 85025 COMPLETE CBC W/AUTO DIFF WBC: CPT

## 2023-11-27 PROCEDURE — 82306 VITAMIN D 25 HYDROXY: CPT

## 2023-11-27 PROCEDURE — 80053 COMPREHEN METABOLIC PANEL: CPT

## 2023-11-27 PROCEDURE — 84443 ASSAY THYROID STIM HORMONE: CPT

## 2023-11-30 ENCOUNTER — OFFICE VISIT (OUTPATIENT)
Dept: INTERNAL MEDICINE | Facility: CLINIC | Age: 28
End: 2023-11-30
Payer: COMMERCIAL

## 2023-11-30 ENCOUNTER — TELEPHONE (OUTPATIENT)
Dept: INTERNAL MEDICINE | Facility: CLINIC | Age: 28
End: 2023-11-30

## 2023-11-30 VITALS
SYSTOLIC BLOOD PRESSURE: 130 MMHG | HEART RATE: 95 BPM | TEMPERATURE: 97 F | BODY MASS INDEX: 32.86 KG/M2 | WEIGHT: 163 LBS | HEIGHT: 59 IN | OXYGEN SATURATION: 99 % | DIASTOLIC BLOOD PRESSURE: 84 MMHG | RESPIRATION RATE: 16 BRPM

## 2023-11-30 DIAGNOSIS — R74.01 TRANSAMINITIS: ICD-10-CM

## 2023-11-30 DIAGNOSIS — E78.2 MODERATE MIXED HYPERLIPIDEMIA NOT REQUIRING STATIN THERAPY: ICD-10-CM

## 2023-11-30 DIAGNOSIS — I49.8 BIGEMINY: ICD-10-CM

## 2023-11-30 DIAGNOSIS — Z00.00 WELL ADULT EXAM: Primary | ICD-10-CM

## 2023-11-30 PROCEDURE — 99395 PREV VISIT EST AGE 18-39: CPT | Mod: ,,, | Performed by: INTERNAL MEDICINE

## 2023-11-30 PROCEDURE — 3075F SYST BP GE 130 - 139MM HG: CPT | Mod: CPTII,,, | Performed by: INTERNAL MEDICINE

## 2023-11-30 PROCEDURE — 3075F PR MOST RECENT SYSTOLIC BLOOD PRESS GE 130-139MM HG: ICD-10-PCS | Mod: CPTII,,, | Performed by: INTERNAL MEDICINE

## 2023-11-30 PROCEDURE — 3044F PR MOST RECENT HEMOGLOBIN A1C LEVEL <7.0%: ICD-10-PCS | Mod: CPTII,,, | Performed by: INTERNAL MEDICINE

## 2023-11-30 PROCEDURE — 1160F RVW MEDS BY RX/DR IN RCRD: CPT | Mod: CPTII,,, | Performed by: INTERNAL MEDICINE

## 2023-11-30 PROCEDURE — 1159F MED LIST DOCD IN RCRD: CPT | Mod: CPTII,,, | Performed by: INTERNAL MEDICINE

## 2023-11-30 PROCEDURE — 1160F PR REVIEW ALL MEDS BY PRESCRIBER/CLIN PHARMACIST DOCUMENTED: ICD-10-PCS | Mod: CPTII,,, | Performed by: INTERNAL MEDICINE

## 2023-11-30 PROCEDURE — 1159F PR MEDICATION LIST DOCUMENTED IN MEDICAL RECORD: ICD-10-PCS | Mod: CPTII,,, | Performed by: INTERNAL MEDICINE

## 2023-11-30 PROCEDURE — 3079F DIAST BP 80-89 MM HG: CPT | Mod: CPTII,,, | Performed by: INTERNAL MEDICINE

## 2023-11-30 PROCEDURE — 3044F HG A1C LEVEL LT 7.0%: CPT | Mod: CPTII,,, | Performed by: INTERNAL MEDICINE

## 2023-11-30 PROCEDURE — 3008F PR BODY MASS INDEX (BMI) DOCUMENTED: ICD-10-PCS | Mod: CPTII,,, | Performed by: INTERNAL MEDICINE

## 2023-11-30 PROCEDURE — 3008F BODY MASS INDEX DOCD: CPT | Mod: CPTII,,, | Performed by: INTERNAL MEDICINE

## 2023-11-30 PROCEDURE — 99395 PR PREVENTIVE VISIT,EST,18-39: ICD-10-PCS | Mod: ,,, | Performed by: INTERNAL MEDICINE

## 2023-11-30 PROCEDURE — 3079F PR MOST RECENT DIASTOLIC BLOOD PRESSURE 80-89 MM HG: ICD-10-PCS | Mod: CPTII,,, | Performed by: INTERNAL MEDICINE

## 2023-11-30 NOTE — TELEPHONE ENCOUNTER
----- Message from Dominique Dallas sent at 11/30/2023  4:26 PM CST -----  .Type:  Patient Returning Call    Who Called:pt   Who Left Message for Patient:  Does the patient know what this is regarding?:email flu shot info   Would the patient rather a call back or a response via [facility]? Call back   Best Call Back Number:[phone]  Additional Information: pt is requesting a copy of the flu shot sent to her email [email or web address]

## 2023-11-30 NOTE — PROGRESS NOTES
Subjective:      Patient ID: Flex Emmanuel is a 28 y.o. female.    Chief Complaint: Annual Exam      HPI:  28 year    [facility] for GYN  C section 6 weeks ago  Bradycardia after surgery dx with [facility] for Cards; holter and EKG  Referred to Dr Chow; holter with kee. Follows up Dr. Chow in December. Sister had ablation.   Laboratory studies from prior to delivery normal LFTs there are no labs after delivery so I do not have a new baseline but she said her hospitalization was complicated see the above-mentioned notes.  Echo with EF of 50% following up with EP.  Alkaline phosphatase at 178, AST at 61, ALT at 172 bilirubin is normal currently breastfeeding; she has no complaints no abdominal pain.  LDL at 206  No complaints, feels fine    Past Medical History:  Past Medical History:   Diagnosis Date    ADD (attention deficit disorder)     Alopecia     Fatigue     Scoliosis     Thyroid disease     Weight gain      Past Surgical History:   Procedure Laterality Date     SECTION N/A 2023    Procedure:  SECTION;  Surgeon: Rosa Monteiro MD;  Location: [facility];  Service: OB/GYN;  Laterality: N/A;     Review of patient's allergies indicates:  No Known Allergies  Current Outpatient Medications on File Prior to Visit   Medication Sig Dispense Refill    levothyroxine (SYNTHROID) 112 MCG tablet Take 112 mcg by mouth once daily.      metFORMIN (GLUCOPHAGE-XR) 750 MG ER 24hr tablet Take 750 mg by mouth 2 (two) times daily.      prenatal vit/iron fum/folic ac (PRENATAL 1+1 ORAL) Take 1 capsule by mouth once daily.      oxyCODONE-acetaminophen (PERCOCET) 5-325 mg per tablet Take 1 tablet by mouth every 4 (four) hours as needed for Pain. (Patient not taking: Reported on 2023) 15 tablet 0     No current facility-administered medications on file prior to visit.     Social History     Socioeconomic History    Marital status:    Tobacco Use    Smoking status: Never    Smokeless tobacco:  Never   Substance and Sexual Activity    Alcohol use: Never    Drug use: Never    Sexual activity: Yes     Partners: Male     Birth control/protection: None   Social History Narrative    ** Merged History Encounter **          Social Determinants of Health     Financial Resource Strain: Low Risk  (11/30/2023)    Overall Financial Resource Strain (CARDIA)     Difficulty of Paying Living Expenses: Not hard at all   Food Insecurity: No Food Insecurity (11/30/2023)    Hunger Vital Sign     Worried About Running Out of Food in the Last Year: Never true     Ran Out of Food in the Last Year: Never true   Transportation Needs: No Transportation Needs (11/30/2023)    PRAPARE - Transportation     Lack of Transportation (Medical): No     Lack of Transportation (Non-Medical): No   Physical Activity: Sufficiently Active (11/30/2023)    Exercise Vital Sign     Days of Exercise per Week: 3 days     Minutes of Exercise per Session: 60 min   Stress: No Stress Concern Present (11/30/2023)    [employer] - Occupational Stress Questionnaire     Feeling of Stress : Not at all   Social Connections: Socially Integrated (11/30/2023)    Social Connection and Isolation Panel [NHANES]     Frequency of Communication with Friends and Family: More than three times a week     Frequency of Social Gatherings with Friends and Family: More than three times a week     Attends Jainism Services: More than 4 times per year     Active Member of Clubs or Organizations: Yes     Attends Club or Organization Meetings: More than 4 times per year     Marital Status:    Housing Stability: Low Risk  (11/30/2023)    Housing Stability Vital Sign     Unable to Pay for Housing in the Last Year: No     Number of Places Lived in the Last Year: 1     Unstable Housing in the Last Year: No     Family History   Problem Relation Age of Onset    Cancer Mother     Heart disease Father     Stroke Paternal Grandfather        Review of  "Systems  A comprehensive review of systems was performed and was negative with exception of what is documented above.     Objective:   /84 (BP Location: Left arm, Patient Position: Sitting, BP Method: Medium (Manual))   Pulse 95   Temp 97.2 °F (36.2 °C) (Temporal)   Resp 16   Ht 4' 11" (1.499 m)   Wt 73.9 kg (163 lb)   LMP 12/09/2022 Comment: Possibility of pregnancy  SpO2 99%   Breastfeeding No   BMI 32.92 kg/m²   Physical Exam  General : Alert and oriented, No acute distress, afebrile. Obese  Eye : PERRLA. EOMI. Normal conjunctiva, Sclerae are nonicteric. No conjunctival injection, no pallor.  HEENT : Normocephalic/ atraumatic, Normal hearing, Oral mucosa is moist.  Respiratory : Respirations are non-labored and clear to auscultation bilaterally. Symmetrical air entry bilaterally, no crackles, no wheezes, no rhonchi. No cyanosis, no clubbing.  Cardiovascular : Normal rate, Regular rhythm. No murmurs, rubs, or gallops. Pulses are 2+ throughout. No JVD. No Edema.  Gastrointestinal : Soft, nontender, non-distended, bowel sounds are present in all quadrants, no organomegaly, no guarding, no rebound.  Musculoskeletal : Normal range of motion throughout. No muscle tenderness.  Integumentary : Warm, moist, intact.  Neurologic : Alert, Oriented  Psychiatric : Cooperative, Appropriate mood & affect.   Assessment/ Plan:   1. Well adult exam  Assessment & Plan:  General health maintenance education given, labs reviewed  Follow up with electrophysiology  RTC 6 weeks with labs, orders placed        2. Transaminitis  -     US Abdomen Limited_Liver; Future; Expected date: 11/30/2023  -     Comprehensive Metabolic Panel; Future; Expected date: 11/30/2023  -     Lipid Panel; Future; Expected date: 11/30/2023    3. Bigeminy    4. Moderate mixed hyperlipidemia not requiring statin therapy  -     Comprehensive Metabolic Panel; Future; Expected date: 11/30/2023  -     Lipid Panel; Future; Expected date: 11/30/2023     "   I feel like her alkaline phosphatase is elevated because of her breastfeeding status and also recent pregnancy/ delivery.  AST and ALT elevations may actually be trending down post delivery- we do not have a previous 1 post delivery for comparison. I will obtain an ultrasound for completeness. Patient with no RUQ pain, no biliary symptoms/ colic. Normal Bili.      Of course her LDL is elevated at 206;  patient is currently breastfeeding so not sure how valid this data is.  She has a family history of hyperlipidemia; we are going to be rechecking these numbers in 6 weeks----- she is breastfeeding so a statin is contraindicated at this point further recommendations to follow her visit with her EP physician    Follow up in about 6 weeks (around 1/11/2024) for brook.

## 2023-11-30 NOTE — ASSESSMENT & PLAN NOTE
General health maintenance education given, labs reviewed  Follow up with electrophysiology  RTC 6 weeks with labs, orders placed

## 2023-12-01 ENCOUNTER — TELEPHONE (OUTPATIENT)
Dept: INTERNAL MEDICINE | Facility: CLINIC | Age: 28
End: 2023-12-01
Payer: COMMERCIAL

## 2023-12-01 NOTE — TELEPHONE ENCOUNTER
----- Message from Sangeeta Spivey sent at 12/1/2023  9:22 AM CST -----  Regarding: call back  .Type:  Needs Medical Advice    Who Called: pt  Symptoms (please be specific):    How long has patient had these symptoms:    Pharmacy name and phone #:    Would the patient rather a call back or a response via [facility]? Call back  Best Call Back Number: [phone]  Additional Information: pt is requesting a copy of her flu shot be sent to her email [email or web address] the one sent was not updated  with recent shot

## 2023-12-04 ENCOUNTER — TELEPHONE (OUTPATIENT)
Dept: INTERNAL MEDICINE | Facility: CLINIC | Age: 28
End: 2023-12-04
Payer: COMMERCIAL

## 2023-12-04 NOTE — TELEPHONE ENCOUNTER
1st ATC.Lvm to call office back    Pt denies med changes or bleeding problems. Pt states she did miss a dose Friday night. Pt will be having chemo today and after last treatment she was sick and missed several days of medication. Pt was instructed to continue current dose and appt made on Monday;pt verbalized. Pt instructed to call and discuss with CC how she's feeling and if she was able to take her coumadin if she were to need to cancel appt Monday; pt vebralized. Patient instructed regarding medication; results given and questions answered. Nutritional counseling given.  Dietary factors affecting therapy addressed.  Patient instructed to monitor for excessive bruising or bleeding. Findings reported by Tata Nunez RN.    Today's INR is   Lab Results - Last 18 Months   Lab Units 07/08/21  0000   INR  1.90*

## 2024-01-02 ENCOUNTER — TELEPHONE (OUTPATIENT)
Dept: INTERNAL MEDICINE | Facility: CLINIC | Age: 29
End: 2024-01-02
Payer: COMMERCIAL

## 2024-01-02 NOTE — TELEPHONE ENCOUNTER
----- Message from Francisco Benjamin MA sent at 12/28/2023  9:09 AM CST -----  Regarding: PV 1/11/24 @ 3:40 Dr. Noel  1. Are there any outstanding tasks in the patient's chart? Yes, fasting labs    2. Is there any documentation in the chart? No    3.Has patient been seen in an ER, Urgent care clinic, or been admitted since last visit?  If yes, When, where, and why    4. Has patient seen any other healthcare providers since last visit?  If yes, when, where, and why    5. Has patient had any bloodwork or XR done since last visit?    6. Is patient signed up for patient portal?

## 2024-01-11 ENCOUNTER — TELEPHONE (OUTPATIENT)
Dept: INTERNAL MEDICINE | Facility: CLINIC | Age: 29
End: 2024-01-11

## 2024-01-11 ENCOUNTER — LAB VISIT (OUTPATIENT)
Dept: LAB | Facility: HOSPITAL | Age: 29
End: 2024-01-11
Attending: INTERNAL MEDICINE
Payer: COMMERCIAL

## 2024-01-11 DIAGNOSIS — R74.01 TRANSAMINITIS: ICD-10-CM

## 2024-01-11 DIAGNOSIS — E78.2 MODERATE MIXED HYPERLIPIDEMIA NOT REQUIRING STATIN THERAPY: ICD-10-CM

## 2024-01-11 LAB
ALBUMIN SERPL-MCNC: 4.2 G/DL (ref 3.5–5)
ALBUMIN/GLOB SERPL: 1.3 RATIO (ref 1.1–2)
ALP SERPL-CCNC: 169 UNIT/L (ref 40–150)
ALT SERPL-CCNC: 205 UNIT/L (ref 0–55)
AST SERPL-CCNC: 57 UNIT/L (ref 5–34)
BILIRUB SERPL-MCNC: 0.3 MG/DL
BUN SERPL-MCNC: 15.1 MG/DL (ref 7–18.7)
CALCIUM SERPL-MCNC: 9.8 MG/DL (ref 8.4–10.2)
CHLORIDE SERPL-SCNC: 107 MMOL/L (ref 98–107)
CHOLEST SERPL-MCNC: 261 MG/DL
CHOLEST/HDLC SERPL: 4 {RATIO} (ref 0–5)
CO2 SERPL-SCNC: 26 MMOL/L (ref 22–29)
CREAT SERPL-MCNC: 0.84 MG/DL (ref 0.55–1.02)
GFR SERPLBLD CREATININE-BSD FMLA CKD-EPI: >60 MLS/MIN/1.73/M2
GLOBULIN SER-MCNC: 3.2 GM/DL (ref 2.4–3.5)
GLUCOSE SERPL-MCNC: 87 MG/DL (ref 74–100)
HDLC SERPL-MCNC: 60 MG/DL (ref 35–60)
LDLC SERPL CALC-MCNC: 180 MG/DL (ref 50–140)
POTASSIUM SERPL-SCNC: 4.7 MMOL/L (ref 3.5–5.1)
PROT SERPL-MCNC: 7.4 GM/DL (ref 6.4–8.3)
SODIUM SERPL-SCNC: 141 MMOL/L (ref 136–145)
TRIGL SERPL-MCNC: 106 MG/DL (ref 37–140)
VLDLC SERPL CALC-MCNC: 21 MG/DL

## 2024-01-11 PROCEDURE — 80053 COMPREHEN METABOLIC PANEL: CPT

## 2024-01-11 PROCEDURE — 80061 LIPID PANEL: CPT

## 2024-01-11 PROCEDURE — 36415 COLL VENOUS BLD VENIPUNCTURE: CPT

## 2024-01-11 NOTE — TELEPHONE ENCOUNTER
----- Message from Damion Rae MD sent at 1/11/2024  8:25 AM CST -----  Laboratory studies reviewed, 1 of the liver functions is better and the 2 others are pretty much unchanged.  I have ordered some further labs to be done if she wants to move this appointment back we can I do not think any of that will be back by this afternoon and I really just need these lab results  Ensure no new vitamins or supplements  Orders placed

## 2024-01-11 NOTE — TELEPHONE ENCOUNTER
Pt informed of lab results . Pt stated she not taking any new vitamins or supplements at this time. Pt will go get labs done tomorrow. Pt need appt on 01/17@1:pm

## 2024-01-11 NOTE — PROGRESS NOTES
Laboratory studies reviewed, 1 of the liver functions is better and the 2 others are pretty much unchanged.  I have ordered some further labs to be done if she wants to move this appointment back we can I do not think any of that will be back by this afternoon and I really just need these lab results  Ensure no new vitamins or supplements  Orders placed

## 2024-01-12 ENCOUNTER — LAB VISIT (OUTPATIENT)
Dept: LAB | Facility: HOSPITAL | Age: 29
End: 2024-01-12
Attending: INTERNAL MEDICINE
Payer: COMMERCIAL

## 2024-01-12 DIAGNOSIS — R74.01 TRANSAMINITIS: ICD-10-CM

## 2024-01-12 LAB
FERRITIN SERPL-MCNC: 32.92 NG/ML (ref 4.63–204)
GGT SERPL-CCNC: 334 U/L (ref 9–36)
HAV IGM SERPL QL IA: NONREACTIVE
HBV CORE IGM SERPL QL IA: NONREACTIVE
HBV SURFACE AG SERPL QL IA: NONREACTIVE
HCV AB SERPL QL IA: NONREACTIVE
IRON SATN MFR SERPL: 22 % (ref 20–50)
IRON SERPL-MCNC: 65 UG/DL (ref 50–170)
TIBC SERPL-MCNC: 229 UG/DL (ref 70–310)
TIBC SERPL-MCNC: 294 UG/DL (ref 250–450)
TRANSFERRIN SERPL-MCNC: 274 MG/DL (ref 180–382)

## 2024-01-12 PROCEDURE — 86381 MITOCHONDRIAL ANTIBODY EACH: CPT

## 2024-01-12 PROCEDURE — 82977 ASSAY OF GGT: CPT

## 2024-01-12 PROCEDURE — 83540 ASSAY OF IRON: CPT

## 2024-01-12 PROCEDURE — 82728 ASSAY OF FERRITIN: CPT

## 2024-01-12 PROCEDURE — 80074 ACUTE HEPATITIS PANEL: CPT

## 2024-01-12 PROCEDURE — 36415 COLL VENOUS BLD VENIPUNCTURE: CPT

## 2024-01-12 PROCEDURE — 86364 TISS TRNSGLTMNASE EA IG CLAS: CPT

## 2024-01-15 LAB — MITOCHONDRIA M2 AB SER IA-ACNC: <0.1 U

## 2024-01-16 ENCOUNTER — TELEPHONE (OUTPATIENT)
Dept: INTERNAL MEDICINE | Facility: CLINIC | Age: 29
End: 2024-01-16
Payer: COMMERCIAL

## 2024-01-16 DIAGNOSIS — R74.01 TRANSAMINITIS: Primary | ICD-10-CM

## 2024-01-16 NOTE — TELEPHONE ENCOUNTER
"Per Dr. Noel, "All related to breastfeeding but I do not want to assume that.  Can we add on a PTH and a bone density as well please"  "

## 2024-01-16 NOTE — TELEPHONE ENCOUNTER
Spoke to pt. Pt Verbally confirmed understanding.  Pt would like to know if she still needs to f/u for appointment with Dr. Noel on 1/17/24 @ 1:00PM

## 2024-01-16 NOTE — TELEPHONE ENCOUNTER
Dominique Dallas Staff  Caller: Unspecified (Today, 11:55 AM)  .Type:  Patient Returning Call    Who Called:pt  Who Left Message for Patient:ann  Does the patient know what this is regarding?:missed call  Would the patient rather a call back or a response via [facility]? Call back  Best Call Back Number:[phone]  Additional Information: called the back line no answer

## 2024-01-16 NOTE — TELEPHONE ENCOUNTER
"Per Dr. Noel, "No I think we can wait until we see Dr. Garcia and get the results of that FibroSure scan back I really have nothing else to add right now unless the patient has any questions and wants to come in just to discuss it?  And can we just double check and make sure she has not taking any new vitamins or supplements?  Is she breastfeeding?"    "

## 2024-01-16 NOTE — TELEPHONE ENCOUNTER
Spoke to pt and she stated that she is not taking any new vitamins or supplements, but she is breast feeding. Appointment scheduled with Dr. Noel for 3/6/24 for f/u on fibrosure scan and appointment with Dr. Noel

## 2024-01-16 NOTE — PROGRESS NOTES
Autoimmune workup is negative please obtain FibroSure scan and referral to Dr. Garcia  Orders for scan placed

## 2024-01-17 LAB — ELIA CELIKEY IGA (TTG IGA) QUANTITATIVE: 0.7 U/ML

## 2024-01-18 ENCOUNTER — PATIENT MESSAGE (OUTPATIENT)
Dept: INTERNAL MEDICINE | Facility: CLINIC | Age: 29
End: 2024-01-18
Payer: COMMERCIAL

## 2024-01-23 ENCOUNTER — TELEPHONE (OUTPATIENT)
Dept: INTERNAL MEDICINE | Facility: CLINIC | Age: 29
End: 2024-01-23
Payer: COMMERCIAL

## 2024-01-23 NOTE — TELEPHONE ENCOUNTER
----- Message from Nelsy Shepherd sent at 1/23/2024  8:40 AM CST -----  Regarding: Dr Garcia office  Dr Garcia office called requesting Dr Noel office resend the referral to them for the patient. They  have not received it.   The fax# is [phone].

## 2024-01-29 ENCOUNTER — PATIENT MESSAGE (OUTPATIENT)
Dept: INTERNAL MEDICINE | Facility: CLINIC | Age: 29
End: 2024-01-29
Payer: COMMERCIAL

## 2024-02-02 ENCOUNTER — HOSPITAL ENCOUNTER (OUTPATIENT)
Dept: RADIOLOGY | Facility: HOSPITAL | Age: 29
Discharge: HOME | End: 2024-02-02
Attending: INTERNAL MEDICINE
Payer: COMMERCIAL

## 2024-02-02 DIAGNOSIS — R74.01 TRANSAMINITIS: ICD-10-CM

## 2024-02-02 PROCEDURE — 77080 DXA BONE DENSITY AXIAL: CPT | Mod: 26,XU,, | Performed by: RADIOLOGY

## 2024-02-02 PROCEDURE — 77081 DXA BONE DENSITY APPENDICULR: CPT | Mod: TC

## 2024-02-02 PROCEDURE — 77081 DXA BONE DENSITY APPENDICULR: CPT | Mod: 26,,, | Performed by: RADIOLOGY

## 2024-02-02 PROCEDURE — 77080 DXA BONE DENSITY AXIAL: CPT | Mod: XU,TC

## 2024-02-05 ENCOUNTER — PATIENT MESSAGE (OUTPATIENT)
Dept: INTERNAL MEDICINE | Facility: CLINIC | Age: 29
End: 2024-02-05
Payer: COMMERCIAL

## 2024-02-05 NOTE — PROGRESS NOTES
DEXA Results: Please inform patient of normal DEXA, indicating normal bone density (strong bones).    Did she see GI yet?

## 2024-02-09 ENCOUNTER — LAB VISIT (OUTPATIENT)
Dept: LAB | Facility: HOSPITAL | Age: 29
End: 2024-02-09
Attending: INTERNAL MEDICINE
Payer: COMMERCIAL

## 2024-02-09 DIAGNOSIS — K76.0 FATTY METAMORPHOSIS OF LIVER: ICD-10-CM

## 2024-02-09 DIAGNOSIS — R74.02 NONSPECIFIC ELEVATION OF LEVELS OF TRANSAMINASE OR LACTIC ACID DEHYDROGENASE (LDH): Primary | ICD-10-CM

## 2024-02-09 DIAGNOSIS — R74.01 NONSPECIFIC ELEVATION OF LEVELS OF TRANSAMINASE OR LACTIC ACID DEHYDROGENASE (LDH): Primary | ICD-10-CM

## 2024-02-09 LAB
ALBUMIN SERPL-MCNC: 4 G/DL (ref 3.5–5)
ALBUMIN/GLOB SERPL: 1.3 RATIO (ref 1.1–2)
ALP SERPL-CCNC: 125 UNIT/L (ref 40–150)
ALT SERPL-CCNC: 98 UNIT/L (ref 0–55)
AST SERPL-CCNC: 44 UNIT/L (ref 5–34)
BILIRUB SERPL-MCNC: 0.3 MG/DL
BUN SERPL-MCNC: 14.2 MG/DL (ref 7–18.7)
CALCIUM SERPL-MCNC: 9.4 MG/DL (ref 8.4–10.2)
CHLORIDE SERPL-SCNC: 109 MMOL/L (ref 98–107)
CO2 SERPL-SCNC: 25 MMOL/L (ref 22–29)
CREAT SERPL-MCNC: 0.82 MG/DL (ref 0.55–1.02)
GFR SERPLBLD CREATININE-BSD FMLA CKD-EPI: >60 MLS/MIN/1.73/M2
GLOBULIN SER-MCNC: 3 GM/DL (ref 2.4–3.5)
GLUCOSE SERPL-MCNC: 83 MG/DL (ref 74–100)
POTASSIUM SERPL-SCNC: 4.6 MMOL/L (ref 3.5–5.1)
PROT SERPL-MCNC: 7 GM/DL (ref 6.4–8.3)
SODIUM SERPL-SCNC: 140 MMOL/L (ref 136–145)

## 2024-02-09 PROCEDURE — 36415 COLL VENOUS BLD VENIPUNCTURE: CPT

## 2024-02-09 PROCEDURE — 80053 COMPREHEN METABOLIC PANEL: CPT

## 2024-02-29 ENCOUNTER — TELEPHONE (OUTPATIENT)
Dept: ELECTROPHYSIOLOGY | Facility: CLINIC | Age: 29
End: 2024-02-29
Payer: COMMERCIAL

## 2024-02-29 DIAGNOSIS — I48.3 TYPICAL ATRIAL FLUTTER: Primary | ICD-10-CM

## 2024-03-04 ENCOUNTER — TELEPHONE (OUTPATIENT)
Dept: ELECTROPHYSIOLOGY | Facility: CLINIC | Age: 29
End: 2024-03-04
Payer: COMMERCIAL

## 2024-03-04 PROBLEM — I49.3 FREQUENT PVCS: Status: ACTIVE | Noted: 2024-03-04

## 2024-03-04 PROBLEM — Z00.00 WELL ADULT EXAM: Status: RESOLVED | Noted: 2023-11-30 | Resolved: 2024-03-04

## 2024-03-04 NOTE — PROGRESS NOTES
Subjective:   Patient ID:  Flex Emmanuel is a 28 y.o. female who presents for evaluation of PVCs    Referring Electrophysiologist: Tono Chow MD  Primary Care Physician: Damion Rae MD    HPI  I had the pleasure of seeing Mrs. Emmanuel today in our electrophysiology clinic in consultation for her premature ventricular contractions. As you are aware she is a pleasant 28 year-old woman with frequent symptomatic PVCs. She had a holter monitor in September of 2023 noting a 32% PVC burden. At that time no medications were started due to nursing. A repeat monitor recently noted a 12% burden. Her sister had an ablation with me ([facility] focus) and she requested consultation for possible mapping/ablation. Notes a few times where she felt like she was going to pass out. She is on low dose diltiazem.    I reviewed available ECGs in EPIC which show sinus rhythm with frequent PVCs (RB, concordant, inferior rightward axis)    My interpretation of today's in-clinic ECG is sinus rhythm without PVCs on the 12 lead however in bigeminy on the rhythm strip (LBBB, sudden V3 transition, inferior leftward axis)    Review of Systems   Constitutional: Negative for fever and malaise/fatigue.   HENT:  Negative for congestion and sore throat.    Eyes:  Negative for blurred vision and visual disturbance.   Cardiovascular:  Positive for palpitations. Negative for chest pain, dyspnea on exertion, irregular heartbeat, near-syncope and syncope.   Respiratory:  Negative for cough and shortness of breath.    Hematologic/Lymphatic: Negative for bleeding problem. Does not bruise/bleed easily.   Skin: Negative.    Musculoskeletal: Negative.    Gastrointestinal:  Negative for bloating, abdominal pain, hematochezia and melena.   Neurological:  Negative for focal weakness and weakness.   Psychiatric/Behavioral: Negative.         Objective:   Physical Exam  Vitals reviewed.   Constitutional:       General: She is not in acute distress.      Appearance: She is well-developed. She is not diaphoretic.   HENT:      Head: Normocephalic and atraumatic.   Eyes:      General:         Right eye: No discharge.         Left eye: No discharge.      Conjunctiva/sclera: Conjunctivae normal.   Cardiovascular:      Rate and Rhythm: Normal rate and regular rhythm. Frequent Extrasystoles are present.     Heart sounds: No murmur heard.     No friction rub. No gallop.   Pulmonary:      Effort: Pulmonary effort is normal. No respiratory distress.      Breath sounds: Normal breath sounds. No wheezing or rales.   Abdominal:      General: Bowel sounds are normal. There is no distension.      Palpations: Abdomen is soft.      Tenderness: There is no abdominal tenderness.   Musculoskeletal:      Cervical back: Neck supple.   Skin:     General: Skin is warm and dry.   Neurological:      Mental Status: She is alert and oriented to person, place, and time.   Psychiatric:         Behavior: Behavior normal.         Thought Content: Thought content normal.         Judgment: Judgment normal.         Assessment:      1. Frequent PVCs        Plan:   In summary, Mrs. Emmanuel is a pleasant 28 year-old woman with frequent symptomatic PVCs. We discussed the etiology, pathophysiology and treatment options/indications for frequent PVCs. Discussed medical therapy for suppression versus mapping/ablation for potential curative treatment. I spent about a half hour discussing the nature of EP study and ablation, including likely retrograde aortic approach. We discussed risks and benefits at length. Our discussion included, but was not limited to the risk of death, infection, bleeding, stroke, MI, cardiac perforation, vascular injury including aortic dissection, valvular injury, embolism, cardiac tamponade, skin burns, and other organic injury including the possibility for need for surgery or pacemaker implantation. She desires mapping/ablation. Discussed her PVC focus is likely coming from the  cardiac summit region. Will first attempt to map via coronary sinus approach followed by retrograde aortic approach.    Plan  PVC mapping/ablation  Carto  Anesthesia  Likely will need interventional cardiology assistance for Perclose deployment  Hold diltiazem 5 days prior    Thank you for allowing me to participate in the care of this patient. Please do not hesitate to call me with any questions or concerns.    Filipe Capellan MD, PhD  Cardiac Electrophysiology

## 2024-03-05 ENCOUNTER — OFFICE VISIT (OUTPATIENT)
Dept: ELECTROPHYSIOLOGY | Facility: CLINIC | Age: 29
End: 2024-03-05
Payer: COMMERCIAL

## 2024-03-05 ENCOUNTER — PATIENT MESSAGE (OUTPATIENT)
Dept: ELECTROPHYSIOLOGY | Facility: CLINIC | Age: 29
End: 2024-03-05

## 2024-03-05 ENCOUNTER — HOSPITAL ENCOUNTER (OUTPATIENT)
Dept: CARDIOLOGY | Facility: CLINIC | Age: 29
Discharge: HOME | End: 2024-03-05
Payer: COMMERCIAL

## 2024-03-05 VITALS
WEIGHT: 166.25 LBS | BODY MASS INDEX: 33.52 KG/M2 | HEIGHT: 59 IN | DIASTOLIC BLOOD PRESSURE: 58 MMHG | HEART RATE: 47 BPM | SYSTOLIC BLOOD PRESSURE: 123 MMHG

## 2024-03-05 DIAGNOSIS — I49.3 FREQUENT PVCS: Primary | ICD-10-CM

## 2024-03-05 DIAGNOSIS — I48.3 TYPICAL ATRIAL FLUTTER: ICD-10-CM

## 2024-03-05 LAB
OHS QRS DURATION: 86 MS
OHS QTC CALCULATION: 425 MS

## 2024-03-05 PROCEDURE — 93010 ELECTROCARDIOGRAM REPORT: CPT | Mod: S$GLB,,, | Performed by: INTERNAL MEDICINE

## 2024-03-05 PROCEDURE — 99999 PR PBB SHADOW E&M-EST. PATIENT-LVL III: CPT | Mod: PBBFAC,,, | Performed by: INTERNAL MEDICINE

## 2024-03-05 PROCEDURE — 1160F RVW MEDS BY RX/DR IN RCRD: CPT | Mod: CPTII,S$GLB,, | Performed by: INTERNAL MEDICINE

## 2024-03-05 PROCEDURE — 3008F BODY MASS INDEX DOCD: CPT | Mod: CPTII,S$GLB,, | Performed by: INTERNAL MEDICINE

## 2024-03-05 PROCEDURE — 1159F MED LIST DOCD IN RCRD: CPT | Mod: CPTII,S$GLB,, | Performed by: INTERNAL MEDICINE

## 2024-03-05 PROCEDURE — 93005 ELECTROCARDIOGRAM TRACING: CPT | Mod: S$GLB,,, | Performed by: INTERNAL MEDICINE

## 2024-03-05 PROCEDURE — 99205 OFFICE O/P NEW HI 60 MIN: CPT | Mod: S$GLB,,, | Performed by: INTERNAL MEDICINE

## 2024-03-05 PROCEDURE — 3074F SYST BP LT 130 MM HG: CPT | Mod: CPTII,S$GLB,, | Performed by: INTERNAL MEDICINE

## 2024-03-05 PROCEDURE — 3078F DIAST BP <80 MM HG: CPT | Mod: CPTII,S$GLB,, | Performed by: INTERNAL MEDICINE

## 2024-03-05 RX ORDER — DILTIAZEM HYDROCHLORIDE 120 MG/1
120 CAPSULE, COATED, EXTENDED RELEASE ORAL
Status: ON HOLD | COMMUNITY
Start: 2024-02-26 | End: 2024-04-10 | Stop reason: HOSPADM

## 2024-03-06 ENCOUNTER — OFFICE VISIT (OUTPATIENT)
Dept: INTERNAL MEDICINE | Facility: CLINIC | Age: 29
End: 2024-03-06
Payer: COMMERCIAL

## 2024-03-06 VITALS
SYSTOLIC BLOOD PRESSURE: 110 MMHG | HEART RATE: 75 BPM | OXYGEN SATURATION: 99 % | BODY MASS INDEX: 33.26 KG/M2 | HEIGHT: 59 IN | DIASTOLIC BLOOD PRESSURE: 60 MMHG | WEIGHT: 165 LBS

## 2024-03-06 DIAGNOSIS — R74.01 TRANSAMINITIS: Primary | ICD-10-CM

## 2024-03-06 DIAGNOSIS — I49.3 FREQUENT PVCS: ICD-10-CM

## 2024-03-06 PROBLEM — O36.8390 NON-REASSURING FETAL HEART RATE WITH LATE DECELERATION: Status: RESOLVED | Noted: 2023-09-13 | Resolved: 2024-03-06

## 2024-03-06 PROBLEM — Z34.90 PREGNANCY: Status: RESOLVED | Noted: 2023-03-31 | Resolved: 2024-03-06

## 2024-03-06 PROBLEM — N97.9 INFERTILITY, FEMALE: Status: RESOLVED | Noted: 2022-09-01 | Resolved: 2024-03-06

## 2024-03-06 PROCEDURE — 1159F MED LIST DOCD IN RCRD: CPT | Mod: CPTII,,, | Performed by: NURSE PRACTITIONER

## 2024-03-06 PROCEDURE — 3078F DIAST BP <80 MM HG: CPT | Mod: CPTII,,, | Performed by: NURSE PRACTITIONER

## 2024-03-06 PROCEDURE — 1160F RVW MEDS BY RX/DR IN RCRD: CPT | Mod: CPTII,,, | Performed by: NURSE PRACTITIONER

## 2024-03-06 PROCEDURE — 99214 OFFICE O/P EST MOD 30 MIN: CPT | Mod: ,,, | Performed by: NURSE PRACTITIONER

## 2024-03-06 PROCEDURE — 3008F BODY MASS INDEX DOCD: CPT | Mod: CPTII,,, | Performed by: NURSE PRACTITIONER

## 2024-03-06 PROCEDURE — 3074F SYST BP LT 130 MM HG: CPT | Mod: CPTII,,, | Performed by: NURSE PRACTITIONER

## 2024-03-06 NOTE — PROGRESS NOTES
Internal Medicine    Patient ID: [id number]     Chief Complaint: Follow-up    HPI:     Flex Emmanuel is a 28 y.o. female here today for a follow up. Seeing Dr. Garcia for fatty liver disease - plans to repeat labwork today. Also seeing Dr. Chow for symptomatic bigeminy. Started of diltiazem. Referred to Dr. Capellan in [place] for ablation consult, which she plans to have on 4/10/24. Twin sister with same condition. No other complaints today.     Past Medical History:   Diagnosis Date    ADD (attention deficit disorder)     Alopecia     Fatigue     Scoliosis     Thyroid disease     Weight gain         Past Surgical History:   Procedure Laterality Date     SECTION N/A 2023    Procedure:  SECTION;  Surgeon: Rosa Monteiro MD;  Location: [facility];  Service: OB/GYN;  Laterality: N/A;        Social History     Tobacco Use    Smoking status: Never    Smokeless tobacco: Never   Substance and Sexual Activity    Alcohol use: Never    Drug use: Never    Sexual activity: Yes     Partners: Male     Birth control/protection: None        Current Outpatient Medications   Medication Instructions    diltiaZEM (CARDIZEM CD) 120 mg, Oral    levothyroxine (SYNTHROID) 112 mcg, Oral, Daily    metFORMIN (GLUCOPHAGE-XR) 750 mg, Oral, With breakfast    oxyCODONE-acetaminophen (PERCOCET) 5-325 mg per tablet 1 tablet, Oral, Every 4 hours PRN    prenatal vit/iron fum/folic ac (PRENATAL 1+1 ORAL) 1 capsule, Oral, Daily       Review of patient's allergies indicates:  No Known Allergies     Patient Care Team:  Damion Rae MD as PCP - General (Internal Medicine)  Damion Rae MD (Internal Medicine)  Rosa Monteiro MD as Consulting Physician (Obstetrics and Gynecology)  Tono Chow MD as Consulting Physician (Cardiology)  Julian King MD as Consulting Physician (Cardiology)  Carlitos Vazquez MD as Consulting Physician (Endocrinology)     Subjective:     Review of Systems    12  "point review of systems conducted, negative except as stated in the history of present illness. See HPI for details.    Objective:     Visit Vitals  /60   Pulse 75   Ht 4' 11" (1.499 m)   Wt 74.8 kg (165 lb)   SpO2 99%   Breastfeeding No   BMI 33.33 kg/m²       Physical Exam  Vitals and nursing note reviewed.   Constitutional:       General: She is not in acute distress.     Appearance: She is not ill-appearing.   HENT:      Head: Normocephalic and atraumatic.      Mouth/Throat:      Mouth: Mucous membranes are moist.      Pharynx: Oropharynx is clear.   Eyes:      General: No scleral icterus.     Extraocular Movements: Extraocular movements intact.      Conjunctiva/sclera: Conjunctivae normal.      Pupils: Pupils are equal, round, and reactive to light.   Neck:      Vascular: No carotid bruit.   Cardiovascular:      Rate and Rhythm: Normal rate and regular rhythm. Frequent Extrasystoles are present.     Heart sounds: No murmur heard.     No friction rub. No gallop.   Pulmonary:      Effort: Pulmonary effort is normal. No respiratory distress.      Breath sounds: Normal breath sounds. No wheezing, rhonchi or rales.   Abdominal:      General: Abdomen is flat. Bowel sounds are normal. There is no distension.      Palpations: Abdomen is soft. There is no mass.      Tenderness: There is no abdominal tenderness.   Musculoskeletal:         General: Normal range of motion.      Cervical back: Normal range of motion and neck supple.   Skin:     General: Skin is warm and dry.   Neurological:      General: No focal deficit present.      Mental Status: She is alert.   Psychiatric:         Mood and Affect: Mood normal.         Labs Reviewed:     Chemistry:  Lab Results   Component Value Date     02/09/2024    K 4.6 02/09/2024    CHLORIDE 109 (H) 02/09/2024    BUN 14.2 02/09/2024    CREATININE 0.82 02/09/2024    EGFRNORACEVR >60 02/09/2024    GLUCOSE 83 02/09/2024    CALCIUM 9.4 02/09/2024    ALKPHOS 125 02/09/2024 "    LABPROT 7.0 02/09/2024    ALBUMIN 4.0 02/09/2024    AST 44 (H) 02/09/2024    ALT 98 (H) 02/09/2024    MG 1.60 09/16/2023    EAEFRCOB55CT 39.3 11/27/2023    TSH 0.547 11/27/2023    VRLLND1FKFT 0.69 (L) 09/13/2023        Lab Results   Component Value Date    HGBA1C 5.0 11/27/2023        Hematology:  Lab Results   Component Value Date    WBC 4.50 11/27/2023    HGB 14.0 11/27/2023    HCT 42.8 11/27/2023     11/27/2023       Lipid Panel:  Lab Results   Component Value Date    CHOL 261 (H) 01/11/2024    HDL 60 01/11/2024    .00 (H) 01/11/2024    TRIG 106 01/11/2024    TOTALCHOLEST 4 01/11/2024        Urine:  Lab Results   Component Value Date    COLORUA Light-Yellow 11/27/2023    APPEARANCEUA Clear 11/27/2023    SGUA 1.009 11/27/2023    PHUA 5.0 11/27/2023    PROTEINUA Negative 11/27/2023    GLUCOSEUA Normal 11/27/2023    KETONESUA Negative 11/27/2023    BLOODUA Negative 11/27/2023    NITRITESUA Negative 11/27/2023    LEUKOCYTESUR Negative 11/27/2023    RBCUA 0-5 11/27/2023    WBCUA 0-5 11/27/2023    BACTERIA None Seen 11/27/2023    SQEPUA Trace 11/27/2023        Assessment:       ICD-10-CM ICD-9-CM   1. Transaminitis  R74.01 790.4   2. Frequent PVCs  I49.3 427.69        Plan:     1. Transaminitis  Assessment & Plan:  Repeat US in 6 months as scheduled.  Followed by Dr. Garcia.  Transaminitis improving        2. Frequent PVCs  Assessment & Plan:  Ablation with Dr. Capellan scheduled in [facility] for 4[facility]10/24  Continue Diltiazem 120mg ER daily        In addition to their scheduled follow up, the patient has also been instructed to follow up on as needed basis.     Future Appointments   Date Time Provider Department Center   4/2/2024  8:15 AM LAB, [facility] DRAW STATION [facility] LABDR [facility]   4/10/2024  5:15 AM 3PREP, [address] [facility] [facility]   12/2/2024  3:20 PM Damion Rae MD [facility] [id number] [id number]        TERRI Blount

## 2024-03-06 NOTE — ASSESSMENT & PLAN NOTE
Ablation with Dr. Capellan scheduled in [facility] for 4/10/24  Continue Diltiazem 120mg ER daily

## 2024-03-07 LAB — PATH REV: NORMAL

## 2024-03-14 ENCOUNTER — PATIENT MESSAGE (OUTPATIENT)
Dept: INTERNAL MEDICINE | Facility: CLINIC | Age: 29
End: 2024-03-14
Payer: COMMERCIAL

## 2024-04-03 ENCOUNTER — LAB VISIT (OUTPATIENT)
Dept: LAB | Facility: HOSPITAL | Age: 29
End: 2024-04-03
Attending: INTERNAL MEDICINE
Payer: COMMERCIAL

## 2024-04-03 DIAGNOSIS — I49.3 FREQUENT PVCS: ICD-10-CM

## 2024-04-03 LAB
ANION GAP SERPL CALC-SCNC: 7 MEQ/L
APTT PPP: 28.4 SECONDS (ref 23.2–33.7)
BUN SERPL-MCNC: 11.5 MG/DL (ref 7–18.7)
CALCIUM SERPL-MCNC: 9.5 MG/DL (ref 8.4–10.2)
CHLORIDE SERPL-SCNC: 107 MMOL/L (ref 98–107)
CO2 SERPL-SCNC: 26 MMOL/L (ref 22–29)
CREAT SERPL-MCNC: 0.81 MG/DL (ref 0.55–1.02)
CREAT/UREA NIT SERPL: 14
ERYTHROCYTE [DISTWIDTH] IN BLOOD BY AUTOMATED COUNT: 13.4 % (ref 11.5–17)
GFR SERPLBLD CREATININE-BSD FMLA CKD-EPI: >60 MLS/MIN/1.73/M2
GLUCOSE SERPL-MCNC: 73 MG/DL (ref 74–100)
HCT VFR BLD AUTO: 41 % (ref 37–47)
HGB BLD-MCNC: 13.5 G/DL (ref 12–16)
INR PPP: 1
MCH RBC QN AUTO: 29.9 PG (ref 27–31)
MCHC RBC AUTO-ENTMCNC: 32.9 G/DL (ref 33–36)
MCV RBC AUTO: 90.7 FL (ref 80–94)
NRBC BLD AUTO-RTO: 0 %
PLATELET # BLD AUTO: 246 X10(3)/MCL (ref 130–400)
PMV BLD AUTO: 10.9 FL (ref 7.4–10.4)
POTASSIUM SERPL-SCNC: 3.9 MMOL/L (ref 3.5–5.1)
PROTHROMBIN TIME: 13.3 SECONDS (ref 12.5–14.5)
RBC # BLD AUTO: 4.52 X10(6)/MCL (ref 4.2–5.4)
SODIUM SERPL-SCNC: 140 MMOL/L (ref 136–145)
WBC # SPEC AUTO: 5.18 X10(3)/MCL (ref 4.5–11.5)

## 2024-04-03 PROCEDURE — 80048 BASIC METABOLIC PNL TOTAL CA: CPT

## 2024-04-03 PROCEDURE — 85730 THROMBOPLASTIN TIME PARTIAL: CPT

## 2024-04-03 PROCEDURE — 36415 COLL VENOUS BLD VENIPUNCTURE: CPT

## 2024-04-03 PROCEDURE — 85027 COMPLETE CBC AUTOMATED: CPT

## 2024-04-03 PROCEDURE — 85610 PROTHROMBIN TIME: CPT

## 2024-04-09 ENCOUNTER — TELEPHONE (OUTPATIENT)
Dept: ELECTROPHYSIOLOGY | Facility: CLINIC | Age: 29
End: 2024-04-09
Payer: COMMERCIAL

## 2024-04-09 ENCOUNTER — PATIENT MESSAGE (OUTPATIENT)
Dept: ELECTROPHYSIOLOGY | Facility: CLINIC | Age: 29
End: 2024-04-09
Payer: COMMERCIAL

## 2024-04-09 NOTE — ASSESSMENT & PLAN NOTE
In summary, Mrs. Emmanuel is a pleasant 28 year-old woman with frequent symptomatic PVCs. We discussed the etiology, pathophysiology and treatment options/indications for frequent PVCs. Discussed medical therapy for suppression versus mapping/ablation for potential curative treatment. I spent about a half hour discussing the nature of EP study and ablation, including likely retrograde aortic approach. We discussed risks and benefits at length. Our discussion included, but was not limited to the risk of death, infection, bleeding, stroke, MI, cardiac perforation, vascular injury including aortic dissection, valvular injury, embolism, cardiac tamponade, skin burns, and other organic injury including the possibility for need for surgery or pacemaker implantation. She desires mapping/ablation. Discussed her PVC focus is likely coming from the cardiac summit region. Will first attempt to map via coronary sinus approach followed by retrograde aortic approach.     Plan  PVC mapping/ablation  Carto  Anesthesia  Likely will need interventional cardiology assistance for Perclose deployment- consented  Hold diltiazem 5 days prior--- she has been off since 4/1/24

## 2024-04-09 NOTE — TELEPHONE ENCOUNTER
Attempted to contact patient to confirm procedure details for PVC Ablation  scheduled on 4/10/2024 with Dr Capellan but no answer received. Left detailed voicemail including: Procedure date/arrival time, NPO after midnight/fasting upon arrival, medication instructions, and request for return call to confirm procedure and patient instructions provided. Patient instructions resent to the patient portal with request to confirm procedure, and understanding and compliance with instructions as provided.

## 2024-04-09 NOTE — HPI
Flex Emmanuel is a 28 y.o. female here for PVC ablation     Referring Electrophysiologist: Tono Chow MD  Primary Care Physician: Damion Rae MD     She is a pleasant 28 year-old woman with frequent symptomatic PVCs. She had a holter monitor in September of 2023 noting a 32% PVC burden. At that time no medications were started due to nursing. A repeat monitor recently noted a 12% burden. Her sister had an ablation with me ([facility] focus) and she requested consultation for possible mapping/ablation. Notes a few times where she felt like she was going to pass out. She is on low dose diltiazem.     I reviewed available ECGs in EPIC which show sinus rhythm with frequent PVCs (RB, concordant, inferior rightward axis)     My interpretation of today's in-clinic ECG is sinus rhythm without PVCs on the 12 lead however in bigeminy on the rhythm strip (LBBB, sudden V3 transition, inferior leftward axis)

## 2024-04-10 ENCOUNTER — ANESTHESIA EVENT (OUTPATIENT)
Dept: MEDSURG UNIT | Facility: HOSPITAL | Age: 29
End: 2024-04-10
Payer: COMMERCIAL

## 2024-04-10 ENCOUNTER — ANESTHESIA (OUTPATIENT)
Dept: MEDSURG UNIT | Facility: HOSPITAL | Age: 29
End: 2024-04-10
Payer: COMMERCIAL

## 2024-04-10 ENCOUNTER — HOSPITAL ENCOUNTER (OUTPATIENT)
Facility: HOSPITAL | Age: 29
Discharge: HOME | End: 2024-04-10
Attending: INTERNAL MEDICINE | Admitting: INTERNAL MEDICINE
Payer: COMMERCIAL

## 2024-04-10 VITALS
BODY MASS INDEX: 30.21 KG/M2 | SYSTOLIC BLOOD PRESSURE: 128 MMHG | DIASTOLIC BLOOD PRESSURE: 67 MMHG | OXYGEN SATURATION: 99 % | HEIGHT: 61 IN | WEIGHT: 160 LBS | HEART RATE: 73 BPM | RESPIRATION RATE: 16 BRPM | TEMPERATURE: 98 F

## 2024-04-10 DIAGNOSIS — I49.3 FREQUENT PVCS: ICD-10-CM

## 2024-04-10 DIAGNOSIS — I49.9 ARRHYTHMIA: ICD-10-CM

## 2024-04-10 LAB
B-HCG UR QL: NEGATIVE
CTP QC/QA: YES
OHS QRS DURATION: 82 MS
OHS QTC CALCULATION: 392 MS

## 2024-04-10 PROCEDURE — 93799 UNLISTED CV SVC/PROCEDURE: CPT | Mod: 26,,, | Performed by: INTERNAL MEDICINE

## 2024-04-10 PROCEDURE — 93010 ELECTROCARDIOGRAM REPORT: CPT | Mod: ,,, | Performed by: INTERNAL MEDICINE

## 2024-04-10 PROCEDURE — 93799 UNLISTED CV SVC/PROCEDURE: CPT | Performed by: INTERNAL MEDICINE

## 2024-04-10 PROCEDURE — 81025 URINE PREGNANCY TEST: CPT | Performed by: NURSE PRACTITIONER

## 2024-04-10 PROCEDURE — 27201423 OPTIME MED/SURG SUP & DEVICES STERILE SUPPLY: Performed by: INTERNAL MEDICINE

## 2024-04-10 PROCEDURE — 93005 ELECTROCARDIOGRAM TRACING: CPT

## 2024-04-10 PROCEDURE — 37000008 HC ANESTHESIA 1ST 15 MINUTES: Performed by: INTERNAL MEDICINE

## 2024-04-10 PROCEDURE — 37000009 HC ANESTHESIA EA ADD 15 MINS: Performed by: INTERNAL MEDICINE

## 2024-04-10 PROCEDURE — 25000003 PHARM REV CODE 250: Performed by: STUDENT IN AN ORGANIZED HEALTH CARE EDUCATION/TRAINING PROGRAM

## 2024-04-10 PROCEDURE — 27201037 HC PRESSURE MONITORING SET UP

## 2024-04-10 RX ORDER — HYDROMORPHONE HYDROCHLORIDE 1 MG/ML
0.2 INJECTION, SOLUTION INTRAMUSCULAR; INTRAVENOUS; SUBCUTANEOUS EVERY 5 MIN PRN
Status: CANCELLED | OUTPATIENT
Start: 2024-04-10

## 2024-04-10 RX ORDER — ONDANSETRON HYDROCHLORIDE 2 MG/ML
4 INJECTION, SOLUTION INTRAVENOUS ONCE AS NEEDED
Status: CANCELLED | OUTPATIENT
Start: 2024-04-10 | End: 2035-09-06

## 2024-04-10 RX ORDER — DROPERIDOL 2.5 MG/ML
0.62 INJECTION, SOLUTION INTRAMUSCULAR; INTRAVENOUS ONCE AS NEEDED
Status: CANCELLED | OUTPATIENT
Start: 2024-04-10 | End: 2035-09-06

## 2024-04-10 RX ADMIN — ISOPROTERENOL HYDROCHLORIDE 2 MCG/MIN: 0.2 INJECTION, SOLUTION INTRAMUSCULAR; INTRAVENOUS at 08:04

## 2024-04-10 RX ADMIN — SODIUM CHLORIDE: 0.9 INJECTION, SOLUTION INTRAVENOUS at 08:04

## 2024-04-10 NOTE — DISCHARGE SUMMARY
[facility] - [facility]iology  Cardiac Electrophysiology  Discharge Summary      Patient Name: Flex Emmanuel  MRN: [id number]  Admission Date: 4/10/2024  Hospital Length of Stay: 0 days  Discharge Date and Time:  04/10/2024 9:06 AM  Attending Physician: Filipe Capellan MD    Discharging Provider: Tracie Rendon MD  Primary Care Physician: Damion Rae MD    HPI:    Flex Emmanuel is a 28 y.o. female here for PVC ablation     Referring Electrophysiologist: Tono Chow MD  Primary Care Physician: Damoin Rae MD     She is a pleasant 28 year-old woman with frequent symptomatic PVCs. She had a holter monitor in September of 2023 noting a 32% PVC burden. At that time no medications were started due to nursing. A repeat monitor recently noted a 12% burden. Her sister had an ablation with me ([facility] focus) and she requested consultation for possible mapping/ablation. Notes a few times where she felt like she was going to pass out. She is on low dose diltiazem.     I reviewed available ECGs in EPIC which show sinus rhythm with frequent PVCs (RB, concordant, inferior rightward axis)     My interpretation of today's in-clinic ECG is sinus rhythm without PVCs on the 12 lead however in bigeminy on the rhythm strip (LBBB, sudden V3 transition, inferior leftward axis)    Procedure(s) (LRB):  EP Drug challenge (N/A)     Indwelling Lines/Drains at time of discharge:  Lines/Drains/Airways       None                   Hospital Course:  Minimal clinical PVCs despite drug challenge with Isoprel 4 given in lab. A few nonclinical PVCs were seen as well. Case aborted.     Goals of Care Treatment Preferences:  Code Status: Full Code      Consults:     Significant Diagnostic Studies: N/A    Pending Diagnostic Studies:       None            Final Active Diagnoses:    Diagnosis Date Noted POA    PRINCIPAL PROBLEM:  Frequent PVCs [I49.3] 03/04/2024 Yes      Problems Resolved During this Admission:     No new  Assessment & Plan notes have been filed under this hospital service since the last note was generated.  Service: Arrhythmia      Discharged Condition: stable    Disposition:     Follow Up: In 4 weeks    Patient Instructions:   Discontinue Diltiazem    Medications:  Reconciled Home Medications:      Medication List        CONTINUE taking these medications      levothyroxine 112 MCG tablet  Commonly known as: SYNTHROID  Take 112 mcg by mouth once daily.     metFORMIN 750 MG ER 24hr tablet  Commonly known as: GLUCOPHAGE-XR  Take 750 mg by mouth daily with breakfast.            STOP taking these medications      diltiaZEM 120 MG Cp24  Commonly known as: CARDIZEM CD              Time spent on the discharge of patient: 45 minutes    Tracie Rendon MD  Cardiac Electrophysiology  [facility] - Cardiology

## 2024-04-10 NOTE — Clinical Note
Patient was not having PVCs in SSCU. Will watch patient for 15 minutes on table then begin isuprel if none appear.

## 2024-04-10 NOTE — ANESTHESIA PREPROCEDURE EVALUATION
[facility]  Anesthesia Pre-Operative Evaluation         Patient Name: Flex Emmanuel  YOB: 1995  MRN: [id number]    SUBJECTIVE:     Pre-operative evaluation for Procedure(s) (LRB):  Ablation (N/A)     04/10/2024    Flex Emmanuel is a 28 y.o. female w/ a significant PMHx of ADD, scoliosis, and thyroid disease.    Patient now presents for the above procedure(s).      LDA:        Peripheral IV - Single Lumen 04/10/24 0609 20 G Right Antecubital (Active)   Site Assessment Clean;Dry;Intact;No redness;No swelling 04/10/24 0609   Extremity Assessment Distal to IV No warmth;No swelling;No redness;No abnormal discoloration 04/10/24 0609   Line Status Blood return noted;Flushed;Saline locked 04/10/24 0609   Dressing Status Clean;Dry;Intact 04/10/24 0609   Dressing Intervention First dressing 04/10/24 0609   Number of days: 0            Peripheral IV - Single Lumen 04/10/24 0623 20 G Anterior;Left;Proximal Forearm (Active)   Site Assessment Clean;Dry;Intact;No redness;No swelling 04/10/24 0623   Extremity Assessment Distal to IV No warmth;No swelling;No redness;No abnormal discoloration 04/10/24 0623   Line Status Blood return noted;Flushed;Saline locked 04/10/24 0623   Dressing Status Clean;Intact;Dry 04/10/24 0623   Dressing Intervention First dressing 04/10/24 0623   Number of days: 0       Prev airway: None documented.    Drips: None documented.      Patient Active Problem List   Diagnosis    Hypothyroid    PCOS (polycystic ovarian syndrome)    Bigeminy    Moderate mixed hyperlipidemia not requiring statin therapy    Transaminitis    Frequent PVCs       Review of patient's allergies indicates:  No Known Allergies    Current Inpatient Medications:      No current facility-administered medications on file prior to encounter.     Current Outpatient Medications on File Prior to Encounter   Medication Sig Dispense Refill    levothyroxine (SYNTHROID) 112 MCG tablet Take 112 mcg by  mouth once daily.      diltiaZEM (CARDIZEM CD) 120 MG Cp24 Take 120 mg by mouth.      metFORMIN (GLUCOPHAGE-XR) 750 MG ER 24hr tablet Take 750 mg by mouth daily with breakfast.         Past Surgical History:   Procedure Laterality Date     SECTION N/A 2023    Procedure:  SECTION;  Surgeon: Rosa Monteiro MD;  [facility];  Service: OB/GYN;  Laterality: N/A;       Social History:  Tobacco Use: Low Risk  (4/10/2024)    Patient History     Smoking Tobacco Use: Never     Smokeless Tobacco Use: Never     Passive Exposure: Not on file      Alcohol Use: Not At Risk (2023)    AUDIT-C     Frequency of Alcohol Consumption: Monthly or less     Average Number of Drinks: 1 or 2     Frequency of Binge Drinking: Never        OBJECTIVE:     Vital Signs Range (Last 24H):  Temp:  [36.1 °C (97 °F)]   Pulse:  [56]   Resp:  [18]   BP: (120-123)/(69-70)   SpO2:  [96 %]       Significant Labs:  Lab Results   Component Value Date    WBC 5.18 2024    HGB 13.5 2024    HCT 41.0 2024     2024    CHOL 261 (H) 2024    TRIG 106 2024    HDL 60 2024    ALT 98 (H) 2024    AST 44 (H) 2024     2024    K 3.9 2024    CREATININE 0.81 2024    BUN 11.5 2024    CO2 26 2024    TSH 0.547 2023    INR 1.0 2024    HGBA1C 5.0 2023       Diagnostic Studies: No relevant studies.    EKG:   Results for orders placed or performed during the hospital encounter of 23   EKG 12-lead    Collection Time: 23  8:11 AM    Narrative    Test Reason : R00.1,    Vent. Rate : 091 BPM     Atrial Rate : 091 BPM     P-R Int : 138 ms          QRS Dur : 084 ms      QT Int : 364 ms       P-R-T Axes : 027 090 049 degrees     QTc Int : 447 ms    Sinus rhythm with frequent Premature ventricular complexes in a pattern of  bigeminy  Rightward axis  Borderline Abnormal ECG  When compared with ECG of 14-SEP-2023 10:03,  No  significant change was found  Confirmed by Diogenes Cole MD ([id number]) on 9/16/2023 10:06:29 AM    Referred By: BEKA PEREZ           Confirmed By:Diogenes Cole MD       2D ECHO:  TTE:  Results for orders placed or performed during the hospital encounter of 09/13/23   Echo   Result Value Ref Range    BSA 1.91 m2    Monique's Biplane MOD Ejection Fraction 59 %    LVOT stroke volume 69.39 cm3    LVIDd 5.01 3.5 - 6.0 cm    LV Systolic Volume 56.60 mL    LV Systolic Volume Index 31.1 mL/m2    LVIDs 3.66 2.1 - 4.0 cm    LV Diastolic Volume 119.00 mL    LV Diastolic Volume Index 65.38 mL/m2    IVS 0.91 0.6 - 1.1 cm    LVOT diameter 2.00 cm    LVOT area 3.1 cm2    FS 27 28 - 44 %    Left Ventricle Relative Wall Thickness 0.37 cm    Posterior Wall 0.92 0.6 - 1.1 cm    LV mass 162.23 g    LV Mass Index 89 g/m2    MV Peak E Mukund 1.13 m/s    TDI LATERAL 0.23 m/s    TDI SEPTAL 0.13 m/s    E/E' ratio 6.28 m/s    MV Peak A Mukund 0.61 m/s    TR Max Mukund 2.13 m/s    E/A ratio 1.85     E wave deceleration time 127.00 msec    LV SEPTAL E/E' RATIO 8.69 m/s    LV LATERAL E/E' RATIO 4.91 m/s    LVOT peak mukund 1.21 m/s    Left Ventricular Outflow Tract Mean Velocity 0.82 cm/s    Left Ventricular Outflow Tract Mean Gradient 3.00 mmHg    LA volume (mod) 62.30 cm3    LA Volume Index (Mod) 34.2 mL/m2    LA size 3.30 cm    RVDD 2.41 cm    TAPSE 2.09 cm    RA Major Axis 4.47 cm    RA Width 3.25 cm    AV mean gradient 6 mmHg    AV peak gradient 10 mmHg    Ao peak mukund 1.59 m/s    Ao VTI 28.00 cm    LVOT peak VTI 22.10 cm    AV valve area 2.48 cm²    AV Velocity Ratio 0.76     AV index (prosthetic) 0.79     VINAY by Velocity Ratio 2.39 cm²    MV mean gradient 2 mmHg    MV peak gradient 3 mmHg    MV valve area by continuity eq 3.32 cm2    MV VTI 20.9 cm    Triscuspid Valve Regurgitation Peak Gradient 18 mmHg    Mean e' 0.18 m/s    ZLVIDS 1.28     ZLVIDD -0.06     EF 50 %    Narrative      Left Ventricle: The left ventricle is normal in size. Normal wall    thickness. Normal wall motion. There is low normal systolic function.   Ejection fraction by visual approximation is 50%. There is normal   diastolic function.    Right Ventricle: Normal right ventricular cavity size. Systolic   function is normal.    Mitral Valve: There is mild regurgitation.    Tricuspid Valve: There is trace regurgitation.    Pulmonic Valve: There is trace regurgitation.         PUNEET:  No results found for this or any previous visit.    ASSESSMENT/PLAN:           Pre-op Assessment    I have reviewed the Patient Summary Reports.     I have reviewed the Nursing Notes. I have reviewed the NPO Status.   I have reviewed the Medications.     Review of Systems  Anesthesia Hx:  No problems with previous Anesthesia   History of prior surgery of interest to airway management or planning:            Denies Personal Hx of Anesthesia complications.                    Social:  Non-Smoker       Hematology/Oncology:  Hematology Normal                                     EENT/Dental:  EENT/Dental Normal           Cardiovascular:  Exercise tolerance: good       Dysrhythmias                                    Pulmonary:  Pulmonary Normal                       Renal/:  Renal/ Normal                 Hepatic/GI:  Hepatic/GI Normal                 Musculoskeletal:  Musculoskeletal Normal                Neurological:  Neurology Normal                                      Endocrine:   Hypothyroidism          Psych:  Psychiatric History                  Physical Exam  General: Well nourished, Alert, Oriented and Cooperative    Airway:  Mallampati: III   Mouth Opening: Small, but > 3cm  TM Distance: Normal  Tongue: Normal  Neck ROM: Normal ROM    Dental:  Retainer    Chest/Lungs:  Clear to auscultation, Normal Respiratory Rate    Heart:  Rate: Normal  Rhythm: Regular Rhythm  Sounds: Normal        Anesthesia Plan  Type of Anesthesia, risks & benefits discussed:    Anesthesia Type: Gen Natural Airway, MAC  Intra-op  Monitoring Plan: Standard ASA Monitors  Post Op Pain Control Plan: multimodal analgesia and IV/PO Opioids PRN  Induction:  IV  Informed Consent: Informed consent signed with the Patient and all parties understand the risks and agree with anesthesia plan.  All questions answered.   ASA Score: 2  Day of Surgery Review of History & Physical: H&P Update referred to the surgeon/provider.    Ready For Surgery From Anesthesia Perspective.     .

## 2024-04-10 NOTE — NURSING
Pt's procedure was aborted. Pt brought to room 4 on SSCU to be discharged. Pt given d/c paperwork and education provided. MD Rendon at bedside to review plan of care. Pt's vs taken and wnl and pt is free from s/s of distress. Pt did not receive sedation. Pt's family at bedside. IV's removed and pt waiting to walk out.

## 2024-04-10 NOTE — H&P
[facility] - Short Stay Cardiac Unit  Cardiac Electrophysiology  History and Physical     Admission Date: 4/10/2024  Code Status: Prior   Attending Provider: Filipe Capellan MD   Principal Problem:Frequent PVCs    Subjective:     Chief Complaint:  PVC     HPI:   Flex Emmanuel is a 28 y.o. female here for PVC ablation     Referring Electrophysiologist: Tono Chow MD  Primary Care Physician: Damion Rae MD     She is a pleasant 28 year-old woman with frequent symptomatic PVCs. She had a holter monitor in 2023 noting a 32% PVC burden. At that time no medications were started due to nursing. A repeat monitor recently noted a 12% burden. Her sister had an ablation with me ([facility] focus) and she requested consultation for possible mapping/ablation. Notes a few times where she felt like she was going to pass out. She is on low dose diltiazem.     I reviewed available ECGs in EPIC which show sinus rhythm with frequent PVCs (RB, concordant, inferior rightward axis)     My interpretation of today's in-clinic ECG is sinus rhythm without PVCs on the 12 lead however in bigeminy on the rhythm strip (LBBB, sudden V3 transition, inferior leftward axis)    Past Medical History:   Diagnosis Date    ADD (attention deficit disorder)     Alopecia     Fatigue     Scoliosis     Thyroid disease     Weight gain        Past Surgical History:   Procedure Laterality Date     SECTION N/A 2023    Procedure:  SECTION;  Surgeon: Rosa Monteiro MD;  Location: [facility]D;  Service: OB/GYN;  Laterality: N/A;       Review of patient's allergies indicates:  No Known Allergies    No current facility-administered medications on file prior to encounter.     Current Outpatient Medications on File Prior to Encounter   Medication Sig    diltiaZEM (CARDIZEM CD) 120 MG Cp24 Take 120 mg by mouth.    levothyroxine (SYNTHROID) 112 MCG tablet Take 112 mcg by mouth once daily.    metFORMIN (GLUCOPHAGE-XR) 750 MG ER  24hr tablet Take 750 mg by mouth daily with breakfast.     Family History       Problem Relation (Age of Onset)    Cancer Mother    Heart disease Father    Stroke Paternal Grandfather          Tobacco Use    Smoking status: Never    Smokeless tobacco: Never   Substance and Sexual Activity    Alcohol use: Never    Drug use: Never    Sexual activity: Yes     Partners: Male     Birth control/protection: None     Review of Systems   All other systems reviewed and are negative.    Objective:     Vital Signs (Most Recent):    Vital Signs (24h Range):  BP: ()/()   Arterial Line BP: ()/()           There is no height or weight on file to calculate BMI.             Physical Exam  Vitals and nursing note reviewed.   Constitutional:       Appearance: Normal appearance.   Cardiovascular:      Rate and Rhythm: Normal rate and regular rhythm.      Pulses: Normal pulses.      Heart sounds: Normal heart sounds.   Pulmonary:      Effort: Pulmonary effort is normal.   Musculoskeletal:      Right lower leg: No edema.      Left lower leg: No edema.   Skin:     General: Skin is warm.   Neurological:      General: No focal deficit present.      Mental Status: She is alert.            Significant Labs: All pertinent lab results from the last 24 hours have been reviewed.    Assessment and Plan:     * Frequent PVCs  In summary, Mrs. Emmanuel is a pleasant 28 year-old woman with frequent symptomatic PVCs. We discussed the etiology, pathophysiology and treatment options/indications for frequent PVCs. Discussed medical therapy for suppression versus mapping/ablation for potential curative treatment. I spent about a half hour discussing the nature of EP study and ablation, including likely retrograde aortic approach. We discussed risks and benefits at length. Our discussion included, but was not limited to the risk of death, infection, bleeding, stroke, MI, cardiac perforation, vascular injury including aortic dissection, valvular injury,  embolism, cardiac tamponade, skin burns, and other organic injury including the possibility for need for surgery or pacemaker implantation. She desires mapping/ablation. Discussed her PVC focus is likely coming from the cardiac summit region. Will first attempt to map via coronary sinus approach followed by retrograde aortic approach.     Plan  PVC mapping/ablation  Carto  Anesthesia  Likely will need interventional cardiology assistance for Perclose deployment- consented  Hold diltiazem 5 days prior--- she has been off since 4/1/24        Tracie Rendon MD  Cardiac Electrophysiology  [facility] - Short Stay Cardiac Unit

## 2024-04-10 NOTE — HOSPITAL COURSE
Minimal clinical PVCs despite drug challenge with Isoprel 4 given in lab. A few nonclinical PVCs were seen as well. Case aborted.

## 2024-04-10 NOTE — TRANSFER OF CARE
"Anesthesia Transfer of Care Note    Patient: Flex Emmanuel    Procedure(s) Performed: Procedure(s) (LRB):  EP Drug challenge (N/A)    Patient location: Other: SSCU    Anesthesia Type: other: and MAC procedure canceled by proceduralist prior to induction    Transport from OR: Transported from OR on room air with adequate spontaneous ventilation    Post pain: adequate analgesia    Post assessment: no apparent anesthetic complications and tolerated procedure well    Post vital signs: stable    Level of consciousness: alert, awake and oriented    Nausea/Vomiting: no nausea/vomiting    Complications: none    Transfer of care protocol was followedComments: Patient not having sufficient PVCs to do the procedure. Procedure aborted. No anesthetic given.      Last vitals: Visit Vitals  /70 (BP Location: Right arm, Patient Position: Lying)   Pulse (!) 56   Temp 36.1 °C (97 °F) (Temporal)   Resp 18   Ht 5' 1" (1.549 m)   Wt 72.6 kg (160 lb)   LMP 04/05/2024 (Exact Date)   SpO2 96%   Breastfeeding No   BMI 30.23 kg/m²     "

## 2024-04-17 ENCOUNTER — TELEPHONE (OUTPATIENT)
Dept: ELECTROPHYSIOLOGY | Facility: CLINIC | Age: 29
End: 2024-04-17
Payer: COMMERCIAL

## 2024-04-17 NOTE — TELEPHONE ENCOUNTER
----- Message from Ortega Dickerson sent at 4/17/2024  1:01 PM CDT -----  Contact: patient  Type:  Patient Call          Who Called: Patient         Does the patient know what this is regarding?: requesting a call back for a follow up appt ;pt was told to schedule in 4 weeks ;pt procedure was canceled  that was scheduled for 4/10 ; please advise           Would the patient rather a call back or a response via [facility]?call           Best Call Back Number: [phone]             Additional Information:

## 2024-04-23 ENCOUNTER — PATIENT MESSAGE (OUTPATIENT)
Dept: ADMINISTRATIVE | Facility: OTHER | Age: 29
End: 2024-04-23
Payer: COMMERCIAL

## 2024-05-14 ENCOUNTER — OFFICE VISIT (OUTPATIENT)
Dept: URGENT CARE | Facility: CLINIC | Age: 29
End: 2024-05-14
Payer: COMMERCIAL

## 2024-05-14 VITALS
OXYGEN SATURATION: 98 % | WEIGHT: 160 LBS | RESPIRATION RATE: 18 BRPM | BODY MASS INDEX: 30.21 KG/M2 | TEMPERATURE: 101 F | DIASTOLIC BLOOD PRESSURE: 85 MMHG | HEART RATE: 110 BPM | SYSTOLIC BLOOD PRESSURE: 133 MMHG | HEIGHT: 61 IN

## 2024-05-14 DIAGNOSIS — J32.9 SINUSITIS, UNSPECIFIED CHRONICITY, UNSPECIFIED LOCATION: Primary | ICD-10-CM

## 2024-05-14 DIAGNOSIS — R05.9 COUGH, UNSPECIFIED TYPE: ICD-10-CM

## 2024-05-14 DIAGNOSIS — R50.9 FEVER, UNSPECIFIED FEVER CAUSE: ICD-10-CM

## 2024-05-14 DIAGNOSIS — N92.6 LATE MENSES: ICD-10-CM

## 2024-05-14 LAB
B-HCG UR QL: NEGATIVE
CTP QC/QA: YES

## 2024-05-14 PROCEDURE — 99213 OFFICE O/P EST LOW 20 MIN: CPT | Mod: 25,,, | Performed by: FAMILY MEDICINE

## 2024-05-14 PROCEDURE — 96372 THER/PROPH/DIAG INJ SC/IM: CPT | Mod: ,,, | Performed by: FAMILY MEDICINE

## 2024-05-14 PROCEDURE — 81025 URINE PREGNANCY TEST: CPT | Mod: ,,, | Performed by: FAMILY MEDICINE

## 2024-05-14 RX ORDER — PROMETHAZINE HYDROCHLORIDE AND DEXTROMETHORPHAN HYDROBROMIDE 6.25; 15 MG/5ML; MG/5ML
5 SYRUP ORAL EVERY 6 HOURS PRN
Qty: 120 ML | Refills: 0 | Status: SHIPPED | OUTPATIENT
Start: 2024-05-14 | End: 2024-05-20

## 2024-05-14 RX ORDER — DEXAMETHASONE SODIUM PHOSPHATE 100 MG/10ML
7 INJECTION INTRAMUSCULAR; INTRAVENOUS
Status: COMPLETED | OUTPATIENT
Start: 2024-05-14 | End: 2024-05-14

## 2024-05-14 RX ORDER — PREDNISONE 10 MG/1
30 TABLET ORAL DAILY
Qty: 9 TABLET | Refills: 0 | Status: SHIPPED | OUTPATIENT
Start: 2024-05-14 | End: 2024-05-17

## 2024-05-14 RX ORDER — DOXYCYCLINE 100 MG/1
100 CAPSULE ORAL 2 TIMES DAILY
Qty: 14 CAPSULE | Refills: 0 | Status: SHIPPED | OUTPATIENT
Start: 2024-05-14 | End: 2024-05-21

## 2024-05-14 RX ADMIN — DEXAMETHASONE SODIUM PHOSPHATE 7 MG: 100 INJECTION INTRAMUSCULAR; INTRAVENOUS at 06:05

## 2024-05-14 NOTE — PATIENT INSTRUCTIONS
Plan:   Chest x-ray negative for pneumonia  Medications sent to pharmacy  Start oral steroids tomorrow  Cough syrup may cause drowsiness.  Do not drink alcohol or drive when taking it  Start taking an allergy pill daily such as claritin, zyrtec, allegrea or xyzal. Also start using a nasal steroid spray such as flonase or nasacort daily. If you are not being treated for high blood pressure, you can also take decongestant such as sudafed as needed. They can be purchased over the counter. If oral steroids were prescribed, start them tomorrow morning. Monitor for fever. Take tylenol/acetaminophen or ibuprofen as needed. Rest and hydrate. If symptoms persist or worsen, return to clinic or seek medical attention immediately.

## 2024-05-15 DIAGNOSIS — I49.3 FREQUENT PVCS: Primary | ICD-10-CM

## 2024-07-25 ENCOUNTER — ON-DEMAND VIRTUAL (OUTPATIENT)
Dept: URGENT CARE | Facility: CLINIC | Age: 29
End: 2024-07-25
Payer: COMMERCIAL

## 2024-07-25 DIAGNOSIS — R21 RASH: Primary | ICD-10-CM

## 2024-07-25 PROCEDURE — 99213 OFFICE O/P EST LOW 20 MIN: CPT | Mod: CC,95,, | Performed by: NURSE PRACTITIONER

## 2024-07-25 RX ORDER — CLOTRIMAZOLE AND BETAMETHASONE DIPROPIONATE 10; .64 MG/G; MG/G
CREAM TOPICAL 2 TIMES DAILY
Qty: 45 G | Refills: 0 | Status: SHIPPED | OUTPATIENT
Start: 2024-07-25

## 2024-07-25 NOTE — PROGRESS NOTES
Subjective:      Patient ID: Flex Emmanuel is a 29 y.o. female.    Vitals:  vitals were not taken for this visit.     Chief Complaint: Rash      Visit Type: TELE AUDIOVISUAL    Present with the patient at the time of consultation: TELEMED PRESENT WITH PATIENT: None        Past Medical History:   Diagnosis Date    ADD (attention deficit disorder)     Alopecia     Fatigue     Scoliosis     Thyroid disease     Weight gain      Past Surgical History:   Procedure Laterality Date     SECTION N/A 2023    Procedure:  SECTION;  Surgeon: Rosa Monteiro MD;  [facility]: [facility]D;  Service: OB/GYN;  Laterality: N/A;     Review of patient's allergies indicates:  No Known Allergies  Current Outpatient Medications on File Prior to Visit   Medication Sig Dispense Refill    levothyroxine (SYNTHROID) 112 MCG tablet Take 112 mcg by mouth once daily.      metFORMIN (GLUCOPHAGE-XR) 750 MG ER 24hr tablet Take 750 mg by mouth daily with breakfast.       No current facility-administered medications on file prior to visit.     Family History   Problem Relation Name Age of Onset    Cancer Mother Divina     Heart disease Father Bill     Stroke Paternal Grandfather Jorge        Medications Ordered                [id number] [place] - [address] AT [address]   [address] [address]    Telephone: [phone]   Fax: [phone]   Hours: Not open 24 hours                         E-Prescribed (1 of 1)              clotrimazole-betamethasone 1-0.05% (LOTRISONE) cream    Sig: Apply topically 2 (two) times daily. AAA       Start: 24     Quantity: 45 g Refills: 0                           Ohs Peq Odvv Intake    2024  2:02 PM CDT - Filed by Patient   What is your current physical address in the event of a medical emergency? Home   Are you able to take your vital signs? No   Please attach any relevant images or files          28 yo female with c/o flaky rash  with distinct border. She states it does not itching. She states she thinks has it also behind ear. She denies drainage.       ROS     Objective:   The physical exam was conducted virtually.  LOCATION OF PATIENT home  Physical Exam    Assessment:     1. Rash        Plan:     Follow up with your primary care provider if symptoms persist.  Go to the Emergency room for worsening of symptoms.     Rash  -     clotrimazole-betamethasone 1-0.05% (LOTRISONE) cream; Apply topically 2 (two) times daily. AAA  Dispense: 45 g; Refill: 0

## 2024-10-14 ENCOUNTER — ON-DEMAND VIRTUAL (OUTPATIENT)
Dept: URGENT CARE | Facility: CLINIC | Age: 29
End: 2024-10-14
Payer: COMMERCIAL

## 2024-10-14 DIAGNOSIS — N30.00 ACUTE CYSTITIS WITHOUT HEMATURIA: Primary | ICD-10-CM

## 2024-10-14 PROCEDURE — 99212 OFFICE O/P EST SF 10 MIN: CPT | Mod: CC,95,, | Performed by: NURSE PRACTITIONER

## 2024-10-14 RX ORDER — NITROFURANTOIN 25; 75 MG/1; MG/1
100 CAPSULE ORAL 2 TIMES DAILY
Qty: 10 CAPSULE | Refills: 0 | Status: SHIPPED | OUTPATIENT
Start: 2024-10-14 | End: 2024-10-19

## 2024-10-14 NOTE — PROGRESS NOTES
Subjective:      Patient ID: Flex Emmanuel is a 29 y.o. female.    Vitals:  vitals were not taken for this visit.     Chief Complaint: Urinary Tract Infection      Visit Type: TELE AUDIOVISUAL    Present with the patient at the time of consultation: TELEMED PRESENT WITH PATIENT: None    Past Medical History:   Diagnosis Date    ADD (attention deficit disorder)     Alopecia     Fatigue     Scoliosis     Thyroid disease     Weight gain      Past Surgical History:   Procedure Laterality Date     SECTION N/A 2023    Procedure:  SECTION;  Surgeon: Rosa Monteiro MD;  Location: [facility];  Service: OB/GYN;  Laterality: N/A;     Review of patient's allergies indicates:  No Known Allergies  Current Outpatient Medications on File Prior to Visit   Medication Sig Dispense Refill    clotrimazole-betamethasone 1-0.05% (LOTRISONE) cream Apply topically 2 (two) times daily. AAA 45 g 0    levothyroxine (SYNTHROID) 112 MCG tablet Take 112 mcg by mouth once daily.      metFORMIN (GLUCOPHAGE-XR) 750 MG ER 24hr tablet Take 750 mg by mouth daily with breakfast.       No current facility-administered medications on file prior to visit.     Family History   Problem Relation Name Age of Onset    Cancer Mother Divina     Heart disease Father Bill     Stroke Paternal Grandfather Jorge        Medications Ordered                [id number] [place] - [address]   [address] [address]    Telephone: [phone]   Fax: [phone]   Hours: Not open 24 hours                         E-Prescribed (1 of 1)              nitrofurantoin, macrocrystal-monohydrate, (MACROBID) 100 MG capsule    Sig: Take 1 capsule (100 mg total) by mouth 2 (two) times daily. for 5 days       Start: 10/14/24     Quantity: 10 capsule Refills: 0                           Ohs Peq Odvv Intake    10/14/2024 11:42 AM CDT - Filed by Patient   What is your current physical address in the  event of a medical emergency? [facility] [facility]- work   Are you able to take your vital signs? No   Please attach any relevant images or files    Is your employer contracted with [facility]? No         Patient states visiting from [facility]. C/o sx onset yesterday   To include dysuria, frequency, urgency           Constitution: Negative for chills, sweating, fatigue and fever.   Neck: Negative for painful lymph nodes.   Cardiovascular:  Negative for chest pain, palpitations and sob on exertion.   Respiratory:  Negative for chest tightness, cough and shortness of breath.    Gastrointestinal:  Negative for abdominal pain, nausea, vomiting, constipation, diarrhea, bright red blood in stool, dark colored stools and rectal bleeding.   Genitourinary:  Positive for dysuria. Negative for flank pain, hematuria, vaginal pain, vaginal discharge, vaginal bleeding, vaginal odor, genital sore and pelvic pain.   Musculoskeletal:  Negative for muscle ache.   Skin:  Negative for color change, pale and rash.   Hematologic/Lymphatic: Negative for swollen lymph nodes.        Objective:   The physical exam was conducted virtually.  Physical Exam   Constitutional: She is oriented to person, place, and time. No distress.   HENT:   Head: Normocephalic and atraumatic.   Mouth/Throat: Oropharynx is clear and moist and mucous membranes are normal.   Eyes: Conjunctivae are normal. No scleral icterus.   Pulmonary/Chest: Effort normal. No respiratory distress.   Musculoskeletal: Normal range of motion.         General: Normal range of motion.   Neurological: She is alert and oriented to person, place, and time.   Skin: Skin is not diaphoretic.   Psychiatric: Her behavior is normal. Judgment and thought content normal.   Vitals reviewed.      Assessment:     1. Acute cystitis without hematuria        Plan:       Acute cystitis without hematuria  -     nitrofurantoin, macrocrystal-monohydrate, (MACROBID) 100 MG capsule; Take  1 capsule (100 mg total) by mouth 2 (two) times daily. for 5 days  Dispense: 10 capsule; Refill: 0                  Patient Instructions   See additional patient Instructions provided    Drink plenty of water  You may take AZO (Pyridium or phenazophyridine) for discomfort which is over the counter. Take as directed on  packaging. It will turn urine bright orange but this will resolve when you stop the medication  Avoid a full bladder, do not postpone urination  Avoid deodorant soaps, body wash, bubble bath, douches, scented toilet paper, deodorant tampons or pads, feminine wipes, chronic pad use   Avoid tight, synthetic clothing, chlorine and wearing wet bathing suits for prolonged periods  Wear cotton underwear, avoid thong underwear and no underwear to bed   Take showers instead of baths and use a hair dryer on cool setting afterwards to dry   Wear cotton to exercise and shower immediately after exercise and change clothes   Perineal hygiene, wipe front to back, regular bladder habits, increase oral fluid intake at first sign of infection  Void after intercourse  Go to the ER for : fever, chills, n/v, back pain, worsening dysuria, hematuria;  Call your PCP if symptoms are not resolved at end of therapy or if new symptoms develop       Patient Instructions   - You must understand that you have received an Urgent Care treatment only and that you may be released before all of your medical problems are known or treated.   - You, the patient, will arrange for follow up care as instructed.   - If your condition worsens or fails to improve we recommend that you receive another evaluation at the ER immediately or contact your PCP to discuss your concerns or return here.     Advised on return/follow-up precautions. Advised on ER precautions. Answered all patient questions. Patient verbalized understanding and voiced agreement with current treatment plan.

## 2024-11-19 ENCOUNTER — TELEPHONE (OUTPATIENT)
Dept: INTERNAL MEDICINE | Facility: CLINIC | Age: 29
End: 2024-11-19
Payer: COMMERCIAL

## 2024-11-19 DIAGNOSIS — E55.9 VITAMIN D DEFICIENCY: ICD-10-CM

## 2024-11-19 DIAGNOSIS — Z00.00 WELLNESS EXAMINATION: Primary | ICD-10-CM

## 2024-11-19 DIAGNOSIS — Z13.29 SCREENING FOR HYPOTHYROIDISM: ICD-10-CM

## 2024-11-19 NOTE — TELEPHONE ENCOUNTER
----- Message from Med Assistant Singh sent at 11/19/2024 11:54 AM CST -----  Regarding: ALY 12/2/24 @ 3:20 Dr. Noel  1. Are there any outstanding tasks in the patient's chart? Yes, fasting labs    2. Is there any documentation in the chart? No    3.Has patient been seen in an ER, Urgent care clinic, or been admitted since last visit?  If yes, When, where, and why    4. Has patient seen any other healthcare providers since last visit?  If yes, when, where, and why    5. Has patient had any bloodwork or XR done since last visit?    6. Is patient signed up for patient portal?

## 2025-01-31 ENCOUNTER — TELEPHONE (OUTPATIENT)
Dept: INTERNAL MEDICINE | Facility: CLINIC | Age: 30
End: 2025-01-31
Payer: COMMERCIAL

## 2025-01-31 NOTE — TELEPHONE ENCOUNTER
----- Message from Med Assistant Singh sent at 1/31/2025  8:00 AM CST -----  Regarding: PV 2/14/25 @ 10:00 Dr. Noel  1. Are there any outstanding tasks in the patient's chart? Yes, fasting labs    2. Is there any documentation in the chart? No    3.Has patient been seen in an ER, Urgent care clinic, or been admitted since last visit?  If yes, When, where, and why    4. Has patient seen any other healthcare providers since last visit?  If yes, when, where, and why    5. Has patient had any bloodwork or XR done since last visit?    6. Is patient signed up for patient portal?    7. Does patient have home blood pressure cuff?   If yes, please have patient bring to appointment for validation.

## 2025-02-05 ENCOUNTER — ON-DEMAND VIRTUAL (OUTPATIENT)
Dept: URGENT CARE | Facility: CLINIC | Age: 30
End: 2025-02-05
Payer: COMMERCIAL

## 2025-02-05 DIAGNOSIS — N89.8 VAGINAL IRRITATION: Primary | ICD-10-CM

## 2025-02-05 PROCEDURE — 98005 SYNCH AUDIO-VIDEO EST LOW 20: CPT | Mod: CC,95,, | Performed by: NURSE PRACTITIONER

## 2025-02-05 RX ORDER — METRONIDAZOLE 500 MG/1
500 TABLET ORAL EVERY 12 HOURS
Qty: 14 TABLET | Refills: 0 | Status: SHIPPED | OUTPATIENT
Start: 2025-02-05 | End: 2025-02-12

## 2025-02-05 RX ORDER — FLUCONAZOLE 150 MG/1
150 TABLET ORAL DAILY
Qty: 2 TABLET | Refills: 0 | Status: SHIPPED | OUTPATIENT
Start: 2025-02-05 | End: 2025-02-07

## 2025-02-05 NOTE — PROGRESS NOTES
Subjective:      Patient ID: Flex Emmanuel is a 29 y.o. female.    Vitals:  vitals were not taken for this visit.     Chief Complaint: Female  Problem      Visit Type: TELE AUDIOVISUAL    Patient Location: Work     Present with the patient at the time of consultation: TELEMED PRESENT WITH PATIENT: None    Past Medical History:   Diagnosis Date    ADD (attention deficit disorder)     Alopecia     Fatigue     Scoliosis     Thyroid disease     Weight gain      Past Surgical History:   Procedure Laterality Date     SECTION N/A 2023    Procedure:  SECTION;  Surgeon: Rosa Monteiro MD;  Location: [facility];  Service: OB/GYN;  Laterality: N/A;     Review of patient's allergies indicates:  No Known Allergies  Current Outpatient Medications on File Prior to Visit   Medication Sig Dispense Refill    levothyroxine (SYNTHROID) 112 MCG tablet Take 112 mcg by mouth once daily.      [DISCONTINUED] clotrimazole-betamethasone 1-0.05% (LOTRISONE) cream Apply topically 2 (two) times daily. AAA 45 g 0    [DISCONTINUED] metFORMIN (GLUCOPHAGE-XR) 750 MG ER 24hr tablet Take 750 mg by mouth daily with breakfast.       No current facility-administered medications on file prior to visit.     Family History   Problem Relation Name Age of Onset    Cancer Mother Divina     Heart disease Father Bill     Stroke Paternal Grandfather Jorge        Medications Ordered                [facility] - [address]   [address] [place] [address] [address] [address]    Telephone: [phone]   Fax: [phone]   Hours: Not open 24 hours                         E-Prescribed (2 of 2)              fluconazole (DIFLUCAN) 150 MG Tab    Sig: Take 1 tablet (150 mg total) by mouth once daily. Take 1 tablet as a single dose. If symptoms persist, may repeat a second dose in 72 hours. for 2 days       Start: 25     Quantity: 2 tablet Refills: 0                          metroNIDAZOLE (FLAGYL) 500 MG tablet    Sig: Take 1 tablet (500 mg total) by mouth every 12 (twelve) hours. for 7 days       Start: 2/5/25     Quantity: 14 tablet Refills: 0                           Ohs Peq Odvv Intake    2/5/2025 11:20 AM CST - Filed by Patient   What is your current physical address in the event of a medical emergency? [facility] - work   Are you able to take your vital signs? No   Please attach any relevant images or files    Is your employer contracted with [facility]? No         Vaginal irritation for 1 week, worsening over the last 2 days. +itching, vaginal pain, and redness. No rash or swelling. Mild discharge. Mild odor(not fishy). Used Monistat 1 day with little relief. Hx of yeast and BV with similar presentation.    Female  Problem  She complains of vaginal discharge. She reports no pelvic pain. Pertinent negatives include no abdominal pain, back pain, dysuria, frequency, rash or urgency.       Gastrointestinal:  Negative for abdominal pain.   Genitourinary:  Positive for vaginal pain, vaginal discharge and vaginal odor. Negative for dysuria, frequency, urgency, genital sore and pelvic pain.   Musculoskeletal:  Negative for back pain.   Skin:  Negative for rash.   Allergic/Immunologic: Positive for itching (vaginal).        Objective:   The physical exam was conducted virtually.  Physical Exam   Constitutional: She is oriented to person, place, and time. She does not appear ill. No distress.   HENT:   Head: Normocephalic and atraumatic.   Nose: Nose normal.   Eyes: Extraocular movement intact   Pulmonary/Chest: Effort normal.   Abdominal: Normal appearance.   Musculoskeletal: Normal range of motion.         General: Normal range of motion.   Neurological: no focal deficit. She is alert and oriented to person, place, and time.   Psychiatric: Her behavior is normal. Mood normal.   Vitals reviewed.      Assessment:     1. Vaginal irritation        Plan:    Patient encouraged to monitor symptoms closely and instructed to follow-up for new or worsening symptoms. Further, in-person, evaluation may be necessary for continued treatment. Please follow up with your primary care doctor or specialist as needed. Verbally discussed plan. Patient confirms understanding and is in agreement with treatment and plan.     You must understand that you've received a Virtual Care evaluation only and that you may be released before all your medical problems are known or treated. You, the patient, will arrange for follow up care as instructed.      Vaginal irritation  -     fluconazole (DIFLUCAN) 150 MG Tab; Take 1 tablet (150 mg total) by mouth once daily. Take 1 tablet as a single dose. If symptoms persist, may repeat a second dose in 72 hours. for 2 days  Dispense: 2 tablet; Refill: 0  -     metroNIDAZOLE (FLAGYL) 500 MG tablet; Take 1 tablet (500 mg total) by mouth every 12 (twelve) hours. for 7 days  Dispense: 14 tablet; Refill: 0

## 2025-02-12 ENCOUNTER — LAB VISIT (OUTPATIENT)
Dept: LAB | Facility: HOSPITAL | Age: 30
End: 2025-02-12
Attending: INTERNAL MEDICINE
Payer: COMMERCIAL

## 2025-02-12 DIAGNOSIS — E55.9 VITAMIN D DEFICIENCY: ICD-10-CM

## 2025-02-12 DIAGNOSIS — Z13.29 SCREENING FOR HYPOTHYROIDISM: ICD-10-CM

## 2025-02-12 DIAGNOSIS — Z00.00 WELLNESS EXAMINATION: ICD-10-CM

## 2025-02-12 LAB
25(OH)D3+25(OH)D2 SERPL-MCNC: 47 NG/ML (ref 30–80)
ALBUMIN SERPL-MCNC: 3.9 G/DL (ref 3.5–5)
ALBUMIN/GLOB SERPL: 1.4 RATIO (ref 1.1–2)
ALP SERPL-CCNC: 87 UNIT/L (ref 40–150)
ALT SERPL-CCNC: 42 UNIT/L (ref 0–55)
ANION GAP SERPL CALC-SCNC: 9 MEQ/L
AST SERPL-CCNC: 21 UNIT/L (ref 5–34)
BACTERIA #/AREA URNS AUTO: ABNORMAL /HPF
BASOPHILS # BLD AUTO: 0.03 X10(3)/MCL
BASOPHILS NFR BLD AUTO: 0.7 %
BILIRUB SERPL-MCNC: 0.5 MG/DL
BILIRUB UR QL STRIP.AUTO: NEGATIVE
BUN SERPL-MCNC: 8.4 MG/DL (ref 7–18.7)
CALCIUM SERPL-MCNC: 9.1 MG/DL (ref 8.4–10.2)
CHLORIDE SERPL-SCNC: 108 MMOL/L (ref 98–107)
CHOLEST SERPL-MCNC: 175 MG/DL
CHOLEST/HDLC SERPL: 4 {RATIO} (ref 0–5)
CLARITY UR: ABNORMAL
CO2 SERPL-SCNC: 23 MMOL/L (ref 22–29)
COLOR UR AUTO: ABNORMAL
CREAT SERPL-MCNC: 0.76 MG/DL (ref 0.55–1.02)
CREAT/UREA NIT SERPL: 11
EOSINOPHIL # BLD AUTO: 0.07 X10(3)/MCL (ref 0–0.9)
EOSINOPHIL NFR BLD AUTO: 1.6 %
ERYTHROCYTE [DISTWIDTH] IN BLOOD BY AUTOMATED COUNT: 12.6 % (ref 11.5–17)
EST. AVERAGE GLUCOSE BLD GHB EST-MCNC: 88.2 MG/DL
GFR SERPLBLD CREATININE-BSD FMLA CKD-EPI: >60 ML/MIN/1.73/M2
GLOBULIN SER-MCNC: 2.7 GM/DL (ref 2.4–3.5)
GLUCOSE SERPL-MCNC: 89 MG/DL (ref 74–100)
GLUCOSE UR QL STRIP: NORMAL
HBA1C MFR BLD: 4.7 %
HCT VFR BLD AUTO: 39.7 % (ref 37–47)
HDLC SERPL-MCNC: 42 MG/DL (ref 35–60)
HGB BLD-MCNC: 13.7 G/DL (ref 12–16)
HGB UR QL STRIP: NEGATIVE
IMM GRANULOCYTES # BLD AUTO: 0.03 X10(3)/MCL (ref 0–0.04)
IMM GRANULOCYTES NFR BLD AUTO: 0.7 %
KETONES UR QL STRIP: ABNORMAL
LDLC SERPL CALC-MCNC: 112 MG/DL (ref 50–140)
LEUKOCYTE ESTERASE UR QL STRIP: 75
LYMPHOCYTES # BLD AUTO: 1.49 X10(3)/MCL (ref 0.6–4.6)
LYMPHOCYTES NFR BLD AUTO: 34.3 %
MCH RBC QN AUTO: 30.6 PG (ref 27–31)
MCHC RBC AUTO-ENTMCNC: 34.5 G/DL (ref 33–36)
MCV RBC AUTO: 88.6 FL (ref 80–94)
MONOCYTES # BLD AUTO: 0.35 X10(3)/MCL (ref 0.1–1.3)
MONOCYTES NFR BLD AUTO: 8 %
MUCOUS THREADS URNS QL MICRO: ABNORMAL /LPF
NEUTROPHILS # BLD AUTO: 2.38 X10(3)/MCL (ref 2.1–9.2)
NEUTROPHILS NFR BLD AUTO: 54.7 %
NITRITE UR QL STRIP: NEGATIVE
NRBC BLD AUTO-RTO: 0 %
PH UR STRIP: 6 [PH]
PLATELET # BLD AUTO: 222 X10(3)/MCL (ref 130–400)
PMV BLD AUTO: 10.3 FL (ref 7.4–10.4)
POTASSIUM SERPL-SCNC: 4.3 MMOL/L (ref 3.5–5.1)
PROT SERPL-MCNC: 6.6 GM/DL (ref 6.4–8.3)
PROT UR QL STRIP: ABNORMAL
RBC # BLD AUTO: 4.48 X10(6)/MCL (ref 4.2–5.4)
RBC #/AREA URNS AUTO: ABNORMAL /HPF
SODIUM SERPL-SCNC: 140 MMOL/L (ref 136–145)
SP GR UR STRIP.AUTO: 1.02 (ref 1–1.03)
SQUAMOUS #/AREA URNS LPF: ABNORMAL /HPF
TRIGL SERPL-MCNC: 105 MG/DL (ref 37–140)
TSH SERPL-ACNC: 1.37 UIU/ML (ref 0.35–4.94)
UROBILINOGEN UR STRIP-ACNC: NORMAL
VLDLC SERPL CALC-MCNC: 21 MG/DL
WBC # BLD AUTO: 4.35 X10(3)/MCL (ref 4.5–11.5)
WBC #/AREA URNS AUTO: ABNORMAL /HPF

## 2025-02-12 PROCEDURE — 82306 VITAMIN D 25 HYDROXY: CPT

## 2025-02-12 PROCEDURE — 83036 HEMOGLOBIN GLYCOSYLATED A1C: CPT

## 2025-02-12 PROCEDURE — 36415 COLL VENOUS BLD VENIPUNCTURE: CPT

## 2025-02-12 PROCEDURE — 85025 COMPLETE CBC W/AUTO DIFF WBC: CPT

## 2025-02-12 PROCEDURE — 84443 ASSAY THYROID STIM HORMONE: CPT

## 2025-02-12 PROCEDURE — 80053 COMPREHEN METABOLIC PANEL: CPT

## 2025-02-12 PROCEDURE — 80061 LIPID PANEL: CPT

## 2025-02-12 PROCEDURE — 81001 URINALYSIS AUTO W/SCOPE: CPT

## 2025-02-14 ENCOUNTER — OFFICE VISIT (OUTPATIENT)
Dept: INTERNAL MEDICINE | Facility: CLINIC | Age: 30
End: 2025-02-14
Payer: COMMERCIAL

## 2025-02-14 VITALS
SYSTOLIC BLOOD PRESSURE: 122 MMHG | WEIGHT: 147 LBS | HEART RATE: 74 BPM | BODY MASS INDEX: 27.75 KG/M2 | DIASTOLIC BLOOD PRESSURE: 64 MMHG | HEIGHT: 61 IN | RESPIRATION RATE: 16 BRPM | OXYGEN SATURATION: 99 % | TEMPERATURE: 98 F

## 2025-02-14 DIAGNOSIS — Z00.00 ANNUAL PHYSICAL EXAM: Primary | ICD-10-CM

## 2025-02-14 DIAGNOSIS — E03.9 HYPOTHYROIDISM, UNSPECIFIED TYPE: ICD-10-CM

## 2025-02-14 DIAGNOSIS — E28.2 PCOS (POLYCYSTIC OVARIAN SYNDROME): ICD-10-CM

## 2025-02-14 RX ORDER — LECITHIN 1000 MG
650 TABLET,CHEWABLE ORAL DAILY
COMMUNITY

## 2025-02-14 RX ORDER — ACETAMINOPHEN 500 MG
1 TABLET ORAL DAILY
COMMUNITY

## 2025-02-14 RX ORDER — CALCIUM CARBONATE/VITAMIN D3 500-10/5ML
400 LIQUID (ML) ORAL NIGHTLY
COMMUNITY

## 2025-02-14 NOTE — PROGRESS NOTES
Internal Medicine    Patient ID: [id number]     Chief Complaint: Annual Exam      HPI:     Flex Emmanuel is a 29 y.o. female here today for a wellness    Patient recently stopped semaglutide, with her last dose taken the previous Wednesday. She expresses concern about her blood sugar management after discontinuing the medication, which she had been taking for weight loss and has reached her goal weight. She mentions having polycystic ovary syndrome (PCOS) and insulin resistance.    She previously took metformin but stopped due to GI side effects. As an alternative, she has been taking inositol, a supplement, but is unsure of its effectiveness.     She reports a history of postpartum fatty liver diagnosed by Dr. Garcia. She had some hair loss while on semaglutide.    She saw an endocrinologist, Dr. Vazquez, earlier today for a thyroid check. Her thyroid and blood sugar were good according to that appointment.    She denies any current symptoms related to blood sugar management or thyroid issues, and any ongoing issues with fatty liver.    TEST RESULTS:  Patient's recent thyroid and blood sugar tests showed good results. Her liver function test was normal. A recent urinalysis showed protein and ketones, but the sample was contaminated with squamous epithelials. A follow-up microscopic urinalysis revealed no white cells or red cells.        Past Medical History:   Diagnosis Date    ADD (attention deficit disorder)     Alopecia     Fatigue     Scoliosis     Thyroid disease     Weight gain         Past Surgical History:   Procedure Laterality Date     SECTION N/A 2023    Procedure:  SECTION;  Surgeon: Rosa Monteiro MD;  Location: [facility];  Service: OB/GYN;  Laterality: N/A;     SECTION  23        Social History     Tobacco Use    Smoking status: Never    Smokeless tobacco: Never   Substance and Sexual Activity    Alcohol use: Yes    Drug use: Never    Sexual activity: Yes  "    Partners: Male     Birth control/protection: None        Current Outpatient Medications   Medication Instructions    cholecalciferol, vitamin D3, (VITAMIN D3) 50 mcg (2,000 unit) Cap capsule 1 capsule, Daily    inositoL 650 mg, Daily    levothyroxine (SYNTHROID) 112 mcg, Daily    magnesium oxide 400 mg, Nightly       Review of patient's allergies indicates:  No Known Allergies     Patient Care Team:  Damion Rae MD as PCP - General (Internal Medicine)  Damion Rae MD (Internal Medicine)  Rosa Monteiro MD as Consulting Physician (Obstetrics and Gynecology)  Tono Chow MD as Consulting Physician (Cardiology)  Julian King MD as Consulting Physician (Cardiology)  Carlitos Vazquez MD as Consulting Physician (Endocrinology)  Rochelle Falcon, PharmD as Hyperlipidemia Digital Medicine Clinician  Damion Rae MD as Hyperlipidemia Digital Medicine Responsible Provider (Internal Medicine)     Subjective:     Review of Systems    12 point review of systems conducted, negative except as stated in the history of present illness. See HPI for details.    Objective:     Visit Vitals  /64 (BP Location: Right arm, Patient Position: Sitting)   Pulse 74   Temp 97.6 °F (36.4 °C) (Temporal)   Resp 16   Ht 5' 1" (1.549 m)   Wt 66.7 kg (147 lb)   LMP 12/30/2024   SpO2 99%   BMI 27.78 kg/m²       Physical Exam  Constitutional:       Appearance: Normal appearance.   HENT:      Head: Normocephalic and atraumatic.   Eyes:      Extraocular Movements: Extraocular movements intact.      Pupils: Pupils are equal, round, and reactive to light.   Cardiovascular:      Rate and Rhythm: Normal rate and regular rhythm.   Pulmonary:      Effort: Pulmonary effort is normal.      Breath sounds: Normal breath sounds.   Skin:     General: Skin is warm and dry.   Neurological:      General: No focal deficit present.      Mental Status: She is alert.   Psychiatric:         Mood and Affect: " Mood normal.         Labs Reviewed:     Chemistry:  Lab Results   Component Value Date     02/12/2025    K 4.3 02/12/2025    BUN 8.4 02/12/2025    CREATININE 0.76 02/12/2025    EGFRNORACEVR >60 02/12/2025    GLUCOSE 89 02/12/2025    CALCIUM 9.1 02/12/2025    ALKPHOS 87 02/12/2025    LABPROT 6.6 02/12/2025    ALBUMIN 3.9 02/12/2025    AST 21 02/12/2025    ALT 42 02/12/2025    MG 1.60 09/16/2023    ALVDYAXH84TQ 47 02/12/2025    TSH 1.366 02/12/2025    PWGAIY8EPFH 0.69 (L) 09/13/2023        Lab Results   Component Value Date    HGBA1C 4.7 02/12/2025        Hematology:  Lab Results   Component Value Date    WBC 4.35 (L) 02/12/2025    HGB 13.7 02/12/2025    HCT 39.7 02/12/2025     02/12/2025       Lipid Panel:  Lab Results   Component Value Date    CHOL 175 02/12/2025    HDL 42 02/12/2025    .00 02/12/2025    TRIG 105 02/12/2025    TOTALCHOLEST 4 02/12/2025        Urine:  Lab Results   Component Value Date    APPEARANCEUA Turbid (A) 02/12/2025    SGUA 1.022 02/12/2025    PROTEINUA Trace (A) 02/12/2025    KETONESUA Trace (A) 02/12/2025    LEUKOCYTESUR 75 (A) 02/12/2025    RBCUA 0-5 02/12/2025    WBCUA 0-5 02/12/2025    BACTERIA Few (A) 02/12/2025    SQEPUA Few (A) 02/12/2025        Assessment and Plan:     Assessment & Plan    E28.2 PCOS (polycystic ovarian syndrome)  E03.9 Hypothyroidism, unspecified type  Z00.00 Annual physical exam      IMPRESSION:   Evaluated patient's recent discontinuation of semaglutide for weight management   Considered potential need for blood sugar monitoring post-semaglutide cessation   Assessed liver function, noting improvement likely due to weight loss   Reviewed urinalysis results, determining contamination and explaining ketones presence due to fasting   Discussed hair thinning, considering potential relation to medication and family history    E28.2 PCOS (POLYCYSTIC OVARIAN SYNDROME):  - Continue inositol supplementation.    Z00.00 ANNUAL PHYSICAL EXAM:  - Clarified  urinalysis contamination and proper urine collection technique.  - Continue vitamin D and magnesium supplementation.           No follow-ups on file. In addition to their scheduled follow up, the patient has also been instructed to follow up on as needed basis.     Future Appointments   Date Time Provider Department Center   8/20/2025  3:40 PM Damion Rae MD [facility]        Damion Rae MD

## 2025-02-21 ENCOUNTER — PATIENT MESSAGE (OUTPATIENT)
Dept: INTERNAL MEDICINE | Facility: CLINIC | Age: 30
End: 2025-02-21
Payer: COMMERCIAL

## 2025-04-02 ENCOUNTER — ON-DEMAND VIRTUAL (OUTPATIENT)
Dept: URGENT CARE | Facility: CLINIC | Age: 30
End: 2025-04-02
Payer: COMMERCIAL

## 2025-04-02 DIAGNOSIS — L73.9 FOLLICULITIS: Primary | ICD-10-CM

## 2025-04-02 RX ORDER — CEPHALEXIN 500 MG/1
1000 CAPSULE ORAL EVERY 12 HOURS
Qty: 40 CAPSULE | Refills: 0 | Status: SHIPPED | OUTPATIENT
Start: 2025-04-02 | End: 2025-04-12

## 2025-04-02 NOTE — PROGRESS NOTES
Subjective:      Patient ID: Flex Emmanuel is a 29 y.o. female.    Vitals:  vitals were not taken for this visit.     Chief Complaint: Skin Problem      Visit Type: TELE AUDIOVISUAL    Past Medical History:   Diagnosis Date    ADD (attention deficit disorder)     Alopecia     Fatigue     Scoliosis     Thyroid disease     Weight gain      Past Surgical History:   Procedure Laterality Date     SECTION N/A 2023    Procedure:  SECTION;  Surgeon: Rosa Monteiro MD;  [facility]  Service: OB/GYN;  Laterality: N/A;     SECTION  23     Review of patient's allergies indicates:  No Known Allergies  Medications Ordered Prior to Encounter[1]  Family History   Problem Relation Name Age of Onset    Cancer Mother Divina     Heart disease Father Bill     Stroke Paternal Grandfather Jorge        Medications Ordered                [facility] [address] [facility] [employer] - [facility] [address] [facility]   [address] [facility] [address]    Telephone: [phone]   Fax: [phone]   Hours: Not open 24 hours                         E-Prescribed (1 of 1)              cephALEXin (KEFLEX) 500 MG capsule    Sig: Take 2 capsules (1,000 mg total) by mouth every 12 (twelve) hours. for 10 days       Start: 25     Quantity: 40 capsule Refills: 0                           Ohs Peq Odvv Intake    2025  3:08 PM CDT - Filed by Patient   What is your current physical address in the event of a medical emergency? [address] [place] [address] Road   Are you able to take your vital signs? No   Please attach any relevant images or files    Is your employer contracted with [employer] [employer]? No         30 y/o pregnant female presents with what she presumes to be infection from an ingrown hair to left axilla.  Tender to touch, no drainage.    Two patient identifiers were used-name was repeated verbally as well as date of birth.  The patient was located in their home in the [facility]  [place].          Skin:  Positive for color change and lesion.        Objective:   The physical exam was conducted virtually.  Physical Exam   Constitutional: She is oriented to person, place, and time. No distress.   HENT:   Head: Normocephalic and atraumatic.   Neck: Neck supple.   Pulmonary/Chest: Effort normal. No respiratory distress.   Abdominal: Normal appearance.   Musculoskeletal: Normal range of motion.         General: Normal range of motion.   Neurological: no focal deficit. She is alert and oriented to person, place, and time.   Skin: Skin is not pale. lesion (pustule with surrounding erythema left axilla)   Psychiatric: Her behavior is normal. Mood, judgment and thought content normal.       Assessment:     1. Folliculitis        Plan:       Folliculitis    Other orders  -     cephALEXin (KEFLEX) 500 MG capsule; Take 2 capsules (1,000 mg total) by mouth every 12 (twelve) hours. for 10 days  Dispense: 40 capsule; Refill: 0    Warm compresses several times daily.  In person exam for I&D if infection fails to resolve.    You must understand that you've received a virtual Care treatment only and that you may be released before all your medical problems are known or treated. You, the patient, will arrange for follow up care as instructed.  If your condition worsens we recommend that you receive another evaluation at an urgent care in person, the emergency room or contact your primary medical clinics after hours call service to discuss your concerns.              Present with the patient at the time of consultation: TELEMED PRESENT WITH PATIENT: None           [1]   Current Outpatient Medications on File Prior to Visit   Medication Sig Dispense Refill    cholecalciferol, vitamin D3, (VITAMIN D3) 50 mcg (2,000 unit) Cap capsule Take 1 capsule by mouth once daily.      inositoL 650 mg Tab Take 650 mg by mouth Daily.      levothyroxine (SYNTHROID) 112 MCG tablet Take 112 mcg by mouth once daily.       magnesium oxide 400 mg magnesium Cap Take 400 mg by mouth every evening.       No current facility-administered medications on file prior to visit.

## 2025-05-12 ENCOUNTER — OFFICE VISIT (OUTPATIENT)
Dept: URGENT CARE | Facility: CLINIC | Age: 30
End: 2025-05-12
Payer: COMMERCIAL

## 2025-05-12 VITALS
WEIGHT: 161.19 LBS | DIASTOLIC BLOOD PRESSURE: 72 MMHG | BODY MASS INDEX: 30.43 KG/M2 | RESPIRATION RATE: 18 BRPM | TEMPERATURE: 97 F | HEART RATE: 71 BPM | SYSTOLIC BLOOD PRESSURE: 112 MMHG | HEIGHT: 61 IN | OXYGEN SATURATION: 100 %

## 2025-05-12 DIAGNOSIS — L02.91 ABSCESS: Primary | ICD-10-CM

## 2025-05-12 PROCEDURE — 99499 UNLISTED E&M SERVICE: CPT | Mod: ,,, | Performed by: FAMILY MEDICINE

## 2025-05-12 NOTE — PROGRESS NOTES
"Patient ID: Flex Emmanuel is a 29 y.o. female.  Chief Complaint: Recurrent Skin Infections    HPI:   Patient presents here today for above reason.      Patient is a 29 y.o. female who presents to urgent care with complaints of boil on inner left thigh, redness, burning x 4  days. Alleviating factors include No med(s) taken for symptoms  with mild amount of relief. Pt states that she has had similar symptoms in her armpit in the past.     Past Medical History:  Past Medical History:   Diagnosis Date    ADD (attention deficit disorder)     Alopecia     Fatigue     Scoliosis     Thyroid disease     Weight gain      Past Surgical History:   Procedure Laterality Date     SECTION N/A 2023    Procedure:  SECTION;  Surgeon: Rosa Monteiro MD;  Location: [facility];  Service: OB/GYN;  Laterality: N/A;     SECTION  23     Review of patient's allergies indicates:  No Known Allergies  Current Outpatient Medications   Medication Instructions    cholecalciferol, vitamin D3, (VITAMIN D3) 50 mcg (2,000 unit) Cap capsule 1 capsule, Daily    levothyroxine (SYNTHROID) 112 mcg, Daily    magnesium oxide 400 mg, Nightly    prenatal no115/iron/folic acid (PRENATAL 19 ORAL) Take by mouth.     Social History[1]    ROS:   Review of Systems  12 point review of systems conducted, negative except as stated in the history of present illness. See HPI for details.   Vitals/PE:   Visit Vitals  /72   Pulse 71   Temp 97.4 °F (36.3 °C) (Oral)   Resp 18   Ht 5' 1" (1.549 m)   Wt 73.1 kg (161 lb 3.2 oz)   LMP 2024   SpO2 100%   BMI 30.46 kg/m²     Physical Exam  Vitals and nursing note reviewed.   Constitutional:       General: She is not in acute distress.     Appearance: Normal appearance. She is not ill-appearing, toxic-appearing or diaphoretic.   HENT:      Head: Normocephalic and atraumatic.      Right Ear: Tympanic membrane normal.      Left Ear: Tympanic membrane normal.      Mouth/Throat: "      Mouth: Mucous membranes are dry.      Pharynx: Oropharynx is clear.   Eyes:      Extraocular Movements: Extraocular movements intact.      Conjunctiva/sclera: Conjunctivae normal.      Pupils: Pupils are equal, round, and reactive to light.   Neck:      Vascular: No carotid bruit.   Cardiovascular:      Rate and Rhythm: Normal rate and regular rhythm.      Pulses: Normal pulses.      Heart sounds: Normal heart sounds. No murmur heard.     No friction rub. No gallop.   Pulmonary:      Effort: Pulmonary effort is normal. No respiratory distress.      Breath sounds: No stridor. No wheezing or rhonchi.   Abdominal:      General: There is no distension.      Palpations: There is no mass.      Tenderness: There is no abdominal tenderness. There is no left CVA tenderness, guarding or rebound.      Hernia: No hernia is present.   Musculoskeletal:         General: No swelling or signs of injury. Normal range of motion.      Cervical back: Normal range of motion and neck supple. No rigidity or tenderness.      Right lower leg: No edema.      Left lower leg: No edema.   Lymphadenopathy:      Cervical: No cervical adenopathy.   Skin:     Capillary Refill: Capillary refill takes less than 2 seconds.      Coloration: Skin is not jaundiced.      Findings: No bruising, lesion or rash.          Neurological:      General: No focal deficit present.      Mental Status: She is alert and oriented to person, place, and time. Mental status is at baseline.      Cranial Nerves: No cranial nerve deficit.      Sensory: No sensory deficit.      Motor: No weakness.      Coordination: Coordination normal.      Gait: Gait normal.   Psychiatric:         Mood and Affect: Mood normal.         Behavior: Behavior normal.         Thought Content: Thought content normal.         Judgment: Judgment normal.         Results for orders placed or performed in visit on 02/12/25   Comprehensive Metabolic Panel    Collection Time: 02/12/25  6:55 AM    Result Value Ref Range    Sodium 140 136 - 145 mmol/L    Potassium 4.3 3.5 - 5.1 mmol/L    Chloride 108 (H) 98 - 107 mmol/L    CO2 23 22 - 29 mmol/L    Glucose 89 74 - 100 mg/dL    Blood Urea Nitrogen 8.4 7.0 - 18.7 mg/dL    Creatinine 0.76 0.55 - 1.02 mg/dL    Calcium 9.1 8.4 - 10.2 mg/dL    Protein Total 6.6 6.4 - 8.3 gm/dL    Albumin 3.9 3.5 - 5.0 g/dL    Globulin 2.7 2.4 - 3.5 gm/dL    Albumin/Globulin Ratio 1.4 1.1 - 2.0 ratio    Bilirubin Total 0.5 <=1.5 mg/dL    ALP 87 40 - 150 unit/L    ALT 42 0 - 55 unit/L    AST 21 5 - 34 unit/L    eGFR >60 mL/min/1.73/m2    Anion Gap 9.0 mEq/L    BUN/Creatinine Ratio 11    Hemoglobin A1C    Collection Time: 02/12/25  6:55 AM   Result Value Ref Range    Hemoglobin A1c 4.7 <=7.0 %    Estimated Average Glucose 88.2 mg/dL   Lipid Panel    Collection Time: 02/12/25  6:55 AM   Result Value Ref Range    Cholesterol Total 175 <=200 mg/dL    HDL Cholesterol 42 35 - 60 mg/dL    Triglyceride 105 37 - 140 mg/dL    Cholesterol/HDL Ratio 4 0 - 5    Very Low Density Lipoprotein 21     LDL Cholesterol 112.00 50.00 - 140.00 mg/dL   TSH    Collection Time: 02/12/25  6:55 AM   Result Value Ref Range    TSH 1.366 0.350 - 4.940 uIU/mL   Urinalysis, Reflex to Urine Culture    Collection Time: 02/12/25  6:55 AM    Specimen: Urine   Result Value Ref Range    Color, UA Light-Yellow Yellow, Light-Yellow, Colorless, Straw, Dark-Yellow    Appearance, UA Turbid (A) Clear    Specific Gravity, UA 1.022 1.005 - 1.030    pH, UA 6.0 5.0 - 8.5    Protein, UA Trace (A) Negative    Glucose, UA Normal Negative, Normal    Ketones, UA Trace (A) Negative    Blood, UA Negative Negative    Bilirubin, UA Negative Negative    Urobilinogen, UA Normal 0.2, 1.0, Normal    Nitrites, UA Negative Negative    Leukocyte Esterase, UA 75 (A) Negative    RBC, UA 0-5 None Seen, 0-2, 3-5, 0-5 /HPF    WBC, UA 0-5 None Seen, 0-2, 3-5, 0-5 /HPF    Bacteria, UA Few (A) None Seen, Trace /HPF    Squamous Epithelial Cells, UA Few (A)  None Seen, Trace, Rare /HPF    Mucous, UA Trace (A) None Seen /LPF   Vitamin D    Collection Time: 02/12/25  6:55 AM   Result Value Ref Range    Vitamin D 47 30 - 80 ng/mL   CBC with Differential    Collection Time: 02/12/25  6:55 AM   Result Value Ref Range    WBC 4.35 (L) 4.50 - 11.50 x10(3)/mcL    RBC 4.48 4.20 - 5.40 x10(6)/mcL    Hgb 13.7 12.0 - 16.0 g/dL    Hct 39.7 37.0 - 47.0 %    MCV 88.6 80.0 - 94.0 fL    MCH 30.6 27.0 - 31.0 pg    MCHC 34.5 33.0 - 36.0 g/dL    RDW 12.6 11.5 - 17.0 %    Platelet 222 130 - 400 x10(3)/mcL    MPV 10.3 7.4 - 10.4 fL    Neut % 54.7 %    Lymph % 34.3 %    Mono % 8.0 %    Eos % 1.6 %    Basophil % 0.7 %    Imm Grans % 0.7 %    Neut # 2.38 2.1 - 9.2 x10(3)/mcL    Lymph # 1.49 0.6 - 4.6 x10(3)/mcL    Mono # 0.35 0.1 - 1.3 x10(3)/mcL    Eos # 0.07 0 - 0.9 x10(3)/mcL    Baso # 0.03 <=0.2 x10(3)/mcL    Imm Gran # 0.03 0.00 - 0.04 x10(3)/mcL    NRBC% 0.0 %     Assessment/Plan:   Abscess  Incision & Drainage    Date/Time: 5/12/2025 4:30 PM    Performed by: Burt Jade FNP  Authorized by: Burt Jade FNP    Consent Done?:  Yes (Verbal) and Yes (Written)    Type:  Abscess  Body area:  Anogenital  Risk factor:  Underlying major vessel  Scalpel size:  11  Incision type:  Single straight  Incision depth: dermal    Complexity:  Simple  Drainage:  Serosanguinous  Drainage amount:  Scant  Packing material:  1/4 in iodoform gauze  Patient tolerance:  Patient tolerated the procedure well with no immediate complications  Pain Assessment: 0    Wound care instructions given and delivered at great length.  Patient is return tomorrow in 24 hours for wound evaluation/recheck.  At this time we will not prescribe oral antibiotics as I and D has been performed.  Return to clinic for further evaluation as noted above.            Education and counseling done face to face regarding medical conditions and plan. Contact office if new symptoms develop. Should any symptoms ever significantly worsen  seek emergency medical attention/go to ER. Follow up at least yearly for wellness or sooner PRN. Nurse to call patient with any results. The patient is receptive, expresses understanding and is agreeable to plan. All questions have been answered.             [1]   Social History  Socioeconomic History    Marital status:    Tobacco Use    Smoking status: Never     Passive exposure: Never    Smokeless tobacco: Never   Substance and Sexual Activity    Alcohol use: Not Currently    Drug use: Never    Sexual activity: Yes     Partners: Male     Birth control/protection: None   Social History Narrative    ** Merged History Encounter **          Social Drivers of Health     Financial Resource Strain: Low Risk  (2/11/2025)    Overall Financial Resource Strain (CARDIA)     Difficulty of Paying Living Expenses: Not hard at all   Food Insecurity: No Food Insecurity (2/11/2025)    Hunger Vital Sign     Worried About Running Out of Food in the Last Year: Never true     Ran Out of Food in the Last Year: Never true   Transportation Needs: No Transportation Needs (2/11/2025)    PRAPARE - Transportation     Lack of Transportation (Medical): No     Lack of Transportation (Non-Medical): No   Physical Activity: Insufficiently Active (2/11/2025)    Exercise Vital Sign     Days of Exercise per Week: 3 days     Minutes of Exercise per Session: 20 min   Stress: No Stress Concern Present (2/11/2025)    [employer] - Occupational Stress Questionnaire     Feeling of Stress : Not at all   Housing Stability: Low Risk  (2/11/2025)    Housing Stability Vital Sign     Unable to Pay for Housing in the Last Year: No     Number of Times Moved in the Last Year: 0     Homeless in the Last Year: No

## 2025-05-13 ENCOUNTER — OFFICE VISIT (OUTPATIENT)
Dept: URGENT CARE | Facility: CLINIC | Age: 30
End: 2025-05-13
Payer: COMMERCIAL

## 2025-05-13 VITALS
BODY MASS INDEX: 30.4 KG/M2 | TEMPERATURE: 99 F | SYSTOLIC BLOOD PRESSURE: 122 MMHG | WEIGHT: 161 LBS | OXYGEN SATURATION: 99 % | HEART RATE: 76 BPM | HEIGHT: 61 IN | RESPIRATION RATE: 18 BRPM | DIASTOLIC BLOOD PRESSURE: 80 MMHG

## 2025-05-13 DIAGNOSIS — Z3A.18 18 WEEKS GESTATION OF PREGNANCY: ICD-10-CM

## 2025-05-13 DIAGNOSIS — Z51.89 WOUND CHECK, ABSCESS: Primary | ICD-10-CM

## 2025-05-13 PROCEDURE — 99213 OFFICE O/P EST LOW 20 MIN: CPT | Mod: ,,, | Performed by: PHYSICIAN ASSISTANT

## 2025-05-13 RX ORDER — CEPHALEXIN 500 MG/1
500 CAPSULE ORAL EVERY 8 HOURS
Qty: 30 CAPSULE | Refills: 0 | Status: SHIPPED | OUTPATIENT
Start: 2025-05-13 | End: 2025-05-23

## 2025-05-13 NOTE — PATIENT INSTRUCTIONS
Concern for left thigh abscess during pregnancy with recent incision and drainage.  May continue mupirocin own/Bactroban antibiotic ointment with bandage changed 2-3 times daily.  Recommend encouraging warm heat to area to encourage circulation.  Recommend Keflex oral antibiotic coverage.    Recommend follow-up with primary care physician and gynecologist's in the next week for wound check.  Recommended emergency department evaluation immediately if fever develops redness and infection spreads despite oral antibiotics

## 2025-05-13 NOTE — PROGRESS NOTES
"Subjective:      Patient ID: Flex Emmanuel is a 29 y.o. female.    Vitals:  height is 5' 1" (1.549 m) and weight is 73 kg (161 lb). Her temperature is 98.7 °F (37.1 °C). Her blood pressure is 122/80 and her pulse is 76. Her respiration is 18 and oxygen saturation is 99%.     Chief Complaint: Abscess    Eighteen week pregnant female reports having left thigh abscess over the last 3 days seen in urgent care yesterday with incision and drainage returns today for wound check.  Patient reports still having erythema and induration no active drainage overnight able to remove packing this morning.  Patient reports history of axilla abscess 1 month ago during pregnancy requiring oral antibiotic.  Patient reports no oral antibiotic prescribed yesterday but has been using prescription Bactroban antibiotic ointment on wound.    Skin:  Positive for erythema and abscess.   Neurological:  Negative for numbness and tingling.      Objective:     Physical Exam   Constitutional: She is oriented to person, place, and time.  Non-toxic appearance.   Musculoskeletal: Normal range of motion.         General: Normal range of motion.   Neurological: no focal deficit. She is alert and oriented to person, place, and time. No sensory deficit.   Skin: Skin is warm, dry and not diaphoretic. erythema         Comments: Proximal left inner thigh 3 cm diameter induration with central incision no active drainage no fluctuance surrounding 5 cm diameter cellulitis   Nursing note and vitals reviewed.chaperone present (Gonzales HAMM)       Assessment:     1. Wound check, abscess    2. 18 weeks gestation of pregnancy        Plan:   Discuss with patient abscess wound check concern for residual induration and erythema we will add pregnancy safe antibiotic in addition to continued home wound care highly recommending follow-up with her physicians and strict emergency department precautions.  Patient ready for discharge now    Concern for left thigh abscess " during pregnancy with recent incision and drainage.  May continue mupirocin own/Bactroban antibiotic ointment with bandage changed 2-3 times daily.  Recommend encouraging warm heat to area to encourage circulation.  Recommend Keflex oral antibiotic coverage.    Recommend follow-up with primary care physician and gynecologist's in the next week for wound check.  Recommended emergency department evaluation immediately if fever develops redness and infection spreads despite oral antibiotics  Wound check, abscess    18 weeks gestation of pregnancy    Other orders  -     cephALEXin (KEFLEX) 500 MG capsule; Take 1 capsule (500 mg total) by mouth every 8 (eight) hours. for 10 days  Dispense: 30 capsule; Refill: 0

## 2025-06-11 ENCOUNTER — TELEPHONE (OUTPATIENT)
Dept: URGENT CARE | Facility: CLINIC | Age: 30
End: 2025-06-11
Payer: COMMERCIAL

## 2025-08-05 ENCOUNTER — ON-DEMAND VIRTUAL (OUTPATIENT)
Dept: URGENT CARE | Facility: CLINIC | Age: 30
End: 2025-08-05
Payer: COMMERCIAL

## 2025-08-05 DIAGNOSIS — L73.9 FOLLICULITIS: Primary | ICD-10-CM

## 2025-08-05 PROCEDURE — 98005 SYNCH AUDIO-VIDEO EST LOW 20: CPT | Mod: CC,95,, | Performed by: NURSE PRACTITIONER

## 2025-08-05 RX ORDER — CEPHALEXIN 500 MG/1
500 CAPSULE ORAL EVERY 6 HOURS
Qty: 20 CAPSULE | Refills: 0 | Status: SHIPPED | OUTPATIENT
Start: 2025-08-05 | End: 2025-08-10

## 2025-08-06 ENCOUNTER — TELEPHONE (OUTPATIENT)
Dept: INTERNAL MEDICINE | Facility: CLINIC | Age: 30
End: 2025-08-06
Payer: COMMERCIAL

## 2025-08-06 NOTE — TELEPHONE ENCOUNTER
----- Message from Med Assistant Singh sent at 8/6/2025 11:59 AM CDT -----  Regarding: ALY 8/20/25 @ 3:40 Dr. Noel  Please r/s pt appt. Pt is currently pregnant. R/s the appt for her wellness. Last wellness was 2/14/25.  Thank you!

## 2025-08-06 NOTE — PROGRESS NOTES
Subjective:      Patient ID: Flxe Emmanuel is a 30 y.o. female.    Vitals:  vitals were not taken for this visit.     Chief Complaint: No chief complaint on file.      Visit Type: TELE AUDIOVISUAL - This visit was conducted virtually based on  subjective information and limited objective exam    Present with the patient at the time of consultation: TELEMED PRESENT WITH PATIENT: None  LOCATION OF PATIENT [place], [place]  Two patient identifiers used to verify patient- saying out date of birth and full name.       Past Medical History:   Diagnosis Date    ADD (attention deficit disorder)     Alopecia     Fatigue     Scoliosis     Thyroid disease     Weight gain      Past Surgical History:   Procedure Laterality Date     SECTION N/A 2023    Procedure:  SECTION;  Surgeon: Rosa Monteiro MD;  [facility]  Service: OB/GYN;  Laterality: N/A;     SECTION  23     Review of patient's allergies indicates:  No Known Allergies  Medications Ordered Prior to Encounter[1]  Family History   Problem Relation Name Age of Onset    Cancer Mother Divina     Heart disease Father Bill     Stroke Paternal Grandfather Jorge        Medications Ordered                [facility] - [address]   [facility] Floor [address] [address]    Telephone: [phone]   Fax: [phone]   Hours: Not open 24 hours                         E-Prescribed (1 of 1)              cephALEXin (KEFLEX) 500 MG capsule    Sig: Take 1 capsule (500 mg total) by mouth every 6 (six) hours. for 5 days       Start: 25     Quantity: 20 capsule Refills: 0                           [facility] Intake    2025  9:19 PM CDT - Filed by Patient   What is your current physical address in the event of a medical emergency? [address]   Are you able to take your vital signs? No   Please attach any relevant images or files    [facility]  Preferred Language          Developing abscess to right axilla and left groin. Denies drainage, no fever. Pt is currently 30 weeks pregnant. This is recurrent. Denies history of DM.         Skin:  Positive for color change and abscess.        Objective:   The physical exam was conducted virtually.    AAO x 3 ; no acute distress noted; appearance normal; mood and behavior normal; thought process normal  Head- normocephalic  Nose- appears normal, no discharge or erythema  Eyes- pupils appear normal in size, no drainage, no erythema  Ears- normal appearing; no discharge, no erythema  Mouth- appears normal  Oropharynx- no erythema, lesions  Lungs- breathing at a normal rate, no acute distress noted  Heart- no reports of tachycardia, palpitations, chest pain  Abdomen- non distended, non tender reported by patient  Skin- erythematous lesion resembling abscess to right axilla and left groin        Assessment:     1. Folliculitis        Plan:   Avoid shaving with razor for now   Apply warm compress to site 3-5 times daily     Thank you for choosing [facility]!    Our goal in the [facility] is to always provide outstanding medical care. You may receive a survey by mail or e-mail in the next week regarding your experience today. We would greatly appreciate you completing and returning the survey. Your feedback provides us with a way to recognize our staff who provide very good care, and it helps us learn how to improve when your experience was below our aspiration of excellence.         We appreciate you trusting us with your medical care. We hope you feel better soon. We will be happy to take care of you for all of your future medical needs.    You must understand that you've received an Urgent Care treatment only and that you may be released before all your medical problems are known or treated. You, the patient, will arrange for follow up care as instructed.    Follow up with your PCP  or specialty clinic as directed in the next 1-2 weeks if not improved or as needed.  You can call [phone] to schedule an appointment with the appropriate provider.    If your condition worsens we recommend that you receive another evaluation in person, with your primary care provider, urgent care or at the emergency room immediately or contact your primary medical clinics after hours call service to discuss your concerns.         Folliculitis  -     cephALEXin (KEFLEX) 500 MG capsule; Take 1 capsule (500 mg total) by mouth every 6 (six) hours. for 5 days  Dispense: 20 capsule; Refill: 0                          [1]   Current Outpatient Medications on File Prior to Visit   Medication Sig Dispense Refill    cholecalciferol, vitamin D3, (VITAMIN D3) 50 mcg (2,000 unit) Cap capsule Take 1 capsule by mouth once daily. (Patient not taking: Reported on 5/13/2025)      levothyroxine (SYNTHROID) 112 MCG tablet Take 112 mcg by mouth once daily.      magnesium oxide 400 mg magnesium Cap Take 400 mg by mouth every evening. (Patient not taking: Reported on 5/13/2025)      prenatal no115/iron/folic acid (PRENATAL 19 ORAL) Take by mouth.       No current facility-administered medications on file prior to visit.

## (undated) DEVICE — SUT MONOCRYL 4-0 PS-1 UND

## (undated) DEVICE — PATCH CARTO REFERENCE

## (undated) DEVICE — SEE MEDLINE ITEM 156931

## (undated) DEVICE — Device

## (undated) DEVICE — PAD SANITARY OB STERILE

## (undated) DEVICE — PAD UNDERPAD 30X30

## (undated) DEVICE — CAP BABY BEANIE

## (undated) DEVICE — SUT 2-0 VICRYL / CT-1

## (undated) DEVICE — SUT 2/0 27IN PLAIN GUT CT

## (undated) DEVICE — PAD DEFIB CADENCE ADULT R2

## (undated) DEVICE — SOL WATER STRL IRR 1000ML

## (undated) DEVICE — SUT 3/0 36IN COATED VICRYL

## (undated) DEVICE — SOL NACL IRR 1000ML BTL

## (undated) DEVICE — ELECTRODE REM PLYHSV RETURN 9

## (undated) DEVICE — BULB SYRINGE EAR IRRIGATION

## (undated) DEVICE — SUT CTD VICRYL 1 VIL BR CTX

## (undated) DEVICE — BINDER ABDOM 4PANEL 12IN LG/XL

## (undated) DEVICE — SEE MEDLINE ITEM 157117

## (undated) DEVICE — SUT CTD VICRYL 0 UND BR CT